# Patient Record
Sex: FEMALE | Race: WHITE | NOT HISPANIC OR LATINO | Employment: FULL TIME | URBAN - METROPOLITAN AREA
[De-identification: names, ages, dates, MRNs, and addresses within clinical notes are randomized per-mention and may not be internally consistent; named-entity substitution may affect disease eponyms.]

---
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
## 2020-06-03 ENCOUNTER — OFFICE VISIT (OUTPATIENT)
Dept: INTERNAL MEDICINE | Facility: CLINIC | Age: 20
End: 2020-06-03

## 2020-06-03 VITALS
WEIGHT: 115 LBS | SYSTOLIC BLOOD PRESSURE: 110 MMHG | HEART RATE: 78 BPM | BODY MASS INDEX: 21.16 KG/M2 | HEIGHT: 62 IN | DIASTOLIC BLOOD PRESSURE: 76 MMHG | OXYGEN SATURATION: 99 % | TEMPERATURE: 98.6 F

## 2020-06-03 DIAGNOSIS — J30.1 SEASONAL ALLERGIC RHINITIS DUE TO POLLEN: ICD-10-CM

## 2020-06-03 DIAGNOSIS — J45.20 MILD INTERMITTENT ASTHMA WITHOUT COMPLICATION: ICD-10-CM

## 2020-06-03 DIAGNOSIS — R10.13 DYSPEPSIA: Primary | ICD-10-CM

## 2020-06-03 DIAGNOSIS — F41.9 ANXIETY: ICD-10-CM

## 2020-06-03 PROCEDURE — 99202 OFFICE O/P NEW SF 15 MIN: CPT | Performed by: INTERNAL MEDICINE

## 2020-06-03 RX ORDER — DEXLANSOPRAZOLE 60 MG/1
60 CAPSULE, DELAYED RELEASE ORAL DAILY
Qty: 15 CAPSULE | Refills: 0 | COMMUNITY
Start: 2020-06-03 | End: 2020-06-22

## 2020-06-03 NOTE — PROGRESS NOTES
Patient is a 19 y.o. female who is here to establish care for anxiety and gi issues.  Chief Complaint   Patient presents with   • Anxiety   • GI Problem         HPI:    Here for evaluation of abdominal issues.  Has epigastric bloating and pain.  Has associated nausea.  Has associated right low abdominal pains.  Will be seeing GYN soon. No wt loss.  Has nausea associated.  Onset about 1 month ago.  No diarrhea or constipation.  Thinks anxiety could be playing a role.  Has food aversion bc of fear of abdominal issues.    Poor sleep, and using CBD melatonin.  Has been on prozac/buspar/welbutrin/stratera/effexor/propranolol.  Has anxiety over driving.  Was on med for GERD in the past.      History:     Patient Active Problem List   Diagnosis   • Dyspepsia   • Mild intermittent asthma without complication   • Seasonal allergic rhinitis due to pollen       Past Medical History:   Diagnosis Date   • Asthma    • Depression    • GERD (gastroesophageal reflux disease)        Past Surgical History:   Procedure Laterality Date   • APPENDECTOMY  2012       Current Outpatient Medications on File Prior to Visit   Medication Sig   • levonorgestrel (KYLEENA) 19.5 MG intrauterine device IUD 1 each by Intrauterine route 1 (One) Time.   • [DISCONTINUED] busPIRone (BUSPAR) 5 MG tablet Take 5 mg by mouth Daily.   • [DISCONTINUED] methylPREDNISolone (MEDROL, BENJA,) 4 MG tablet Take as directed on package instructions.   • [DISCONTINUED] propranolol (INDERAL) 40 MG tablet Take 10 mg by mouth As Needed.     No current facility-administered medications on file prior to visit.        Family History   Problem Relation Age of Onset   • Mental illness Mother    • Migraines Mother    • Mental illness Father    • Migraines Sister    • Breast cancer Paternal Grandmother        Social History     Socioeconomic History   • Marital status: Single     Spouse name: Not on file   • Number of children: Not on file   • Years of education: Not on file   •  "Highest education level: Not on file   Tobacco Use   • Smoking status: Never Smoker   • Smokeless tobacco: Never Used         Review of Systems   Constitutional: Negative for chills, fatigue, fever and unexpected weight change.   HENT: Negative for congestion, ear pain, hearing loss, rhinorrhea, sinus pressure, sore throat and trouble swallowing.    Eyes: Negative for discharge and itching.   Respiratory: Positive for shortness of breath (with stress). Negative for cough and chest tightness.    Cardiovascular: Negative for chest pain, palpitations and leg swelling.   Gastrointestinal: Positive for abdominal pain. Negative for blood in stool, constipation, diarrhea and vomiting.        See HPI   Endocrine: Positive for heat intolerance. Negative for polydipsia and polyuria.   Genitourinary: Negative for difficulty urinating, dysuria, enuresis, frequency, hematuria and urgency.        Hx of frequent UTI   Musculoskeletal: Negative for arthralgias, back pain, gait problem and joint swelling.   Skin: Negative for rash and wound.   Allergic/Immunologic: Positive for environmental allergies (seasonal). Negative for immunocompromised state.   Neurological: Negative for dizziness, syncope, weakness, light-headedness, numbness and headaches.   Hematological: Does not bruise/bleed easily.   Psychiatric/Behavioral: Positive for sleep disturbance. Negative for behavioral problems and dysphoric mood. The patient is nervous/anxious.        /76   Pulse 78   Temp 98.6 °F (37 °C) (Temporal)   Ht 157.5 cm (62\")   Wt 52.2 kg (115 lb)   SpO2 99%   BMI 21.03 kg/m²       Physical Exam   Constitutional: She is oriented to person, place, and time. She appears well-developed and well-nourished.   HENT:   Head: Normocephalic and atraumatic.   Right Ear: External ear normal.   Left Ear: External ear normal.   Mouth/Throat: Oropharynx is clear and moist.   Eyes: Pupils are equal, round, and reactive to light. Conjunctivae and EOM " are normal.   Neck: Normal range of motion. Neck supple.   Cardiovascular: Normal rate, regular rhythm, normal heart sounds and intact distal pulses.   Pulmonary/Chest: Effort normal and breath sounds normal.   Abdominal: Soft. Bowel sounds are normal. There is tenderness (epigastric).   Musculoskeletal: Normal range of motion.   Neurological: She is alert and oriented to person, place, and time. She has normal reflexes.   Skin: Skin is warm and dry.   Psychiatric: She has a normal mood and affect. Her behavior is normal. Judgment and thought content normal.   Vitals reviewed.      Procedure:      Discussion/Summary:    Dyspepsia/epigastric tenderness-?gastritis, labs on rtc, trial of Dexilant for 2 weeks  ILDEFONSO-f/u with   Asthma/rhinitis-stable    Current Outpatient Medications:   •  levonorgestrel (KYLEENA) 19.5 MG intrauterine device IUD, 1 each by Intrauterine route 1 (One) Time., Disp: , Rfl:   •  dexlansoprazole (Dexilant) 60 MG capsule, Take 1 capsule by mouth Daily for 30 days., Disp: 15 capsule, Rfl: 0        Cecilia was seen today for anxiety and gi problem.    Diagnoses and all orders for this visit:    Dyspepsia    Anxiety  -     Ambulatory Referral to Behavioral Health    Mild intermittent asthma without complication    Seasonal allergic rhinitis due to pollen    Other orders  -     dexlansoprazole (Dexilant) 60 MG capsule; Take 1 capsule by mouth Daily for 30 days.

## 2020-06-04 ENCOUNTER — TELEPHONE (OUTPATIENT)
Dept: INTERNAL MEDICINE | Facility: CLINIC | Age: 20
End: 2020-06-04

## 2020-06-04 DIAGNOSIS — T78.40XD ALLERGIC STATE, SUBSEQUENT ENCOUNTER: Primary | ICD-10-CM

## 2020-06-04 NOTE — TELEPHONE ENCOUNTER
Patient states that she discussed her allergies with Dr. Armenta - would like a referral to an Allergy office

## 2020-06-22 ENCOUNTER — OFFICE VISIT (OUTPATIENT)
Dept: INTERNAL MEDICINE | Facility: CLINIC | Age: 20
End: 2020-06-22

## 2020-06-22 ENCOUNTER — LAB (OUTPATIENT)
Dept: LAB | Facility: HOSPITAL | Age: 20
End: 2020-06-22

## 2020-06-22 VITALS
HEART RATE: 60 BPM | DIASTOLIC BLOOD PRESSURE: 60 MMHG | TEMPERATURE: 97.7 F | SYSTOLIC BLOOD PRESSURE: 100 MMHG | BODY MASS INDEX: 21.16 KG/M2 | WEIGHT: 115 LBS | HEIGHT: 62 IN

## 2020-06-22 DIAGNOSIS — Z13.220 SCREENING FOR LIPID DISORDERS: ICD-10-CM

## 2020-06-22 DIAGNOSIS — R10.13 DYSPEPSIA: ICD-10-CM

## 2020-06-22 DIAGNOSIS — Z11.1 SCREENING FOR TUBERCULOSIS: Primary | ICD-10-CM

## 2020-06-22 DIAGNOSIS — J30.1 SEASONAL ALLERGIC RHINITIS DUE TO POLLEN: Primary | ICD-10-CM

## 2020-06-22 DIAGNOSIS — J45.20 MILD INTERMITTENT ASTHMA WITHOUT COMPLICATION: ICD-10-CM

## 2020-06-22 LAB
DEPRECATED RDW RBC AUTO: 41.3 FL (ref 37–54)
ERYTHROCYTE [DISTWIDTH] IN BLOOD BY AUTOMATED COUNT: 12.8 % (ref 12.3–15.4)
HCT VFR BLD AUTO: 43.1 % (ref 34–46.6)
HGB BLD-MCNC: 14.9 G/DL (ref 12–15.9)
MCH RBC QN AUTO: 30.3 PG (ref 26.6–33)
MCHC RBC AUTO-ENTMCNC: 34.6 G/DL (ref 31.5–35.7)
MCV RBC AUTO: 87.6 FL (ref 79–97)
PLATELET # BLD AUTO: 229 10*3/MM3 (ref 140–450)
PMV BLD AUTO: 10.6 FL (ref 6–12)
RBC # BLD AUTO: 4.92 10*6/MM3 (ref 3.77–5.28)
WBC NRBC COR # BLD: 5.34 10*3/MM3 (ref 3.4–10.8)

## 2020-06-22 PROCEDURE — 86481 TB AG RESPONSE T-CELL SUSP: CPT | Performed by: INTERNAL MEDICINE

## 2020-06-22 PROCEDURE — 82607 VITAMIN B-12: CPT | Performed by: INTERNAL MEDICINE

## 2020-06-22 PROCEDURE — 36415 COLL VENOUS BLD VENIPUNCTURE: CPT

## 2020-06-22 PROCEDURE — 80053 COMPREHEN METABOLIC PANEL: CPT | Performed by: INTERNAL MEDICINE

## 2020-06-22 PROCEDURE — 85027 COMPLETE CBC AUTOMATED: CPT | Performed by: INTERNAL MEDICINE

## 2020-06-22 PROCEDURE — 84443 ASSAY THYROID STIM HORMONE: CPT | Performed by: INTERNAL MEDICINE

## 2020-06-22 PROCEDURE — 80061 LIPID PANEL: CPT | Performed by: INTERNAL MEDICINE

## 2020-06-22 PROCEDURE — 99213 OFFICE O/P EST LOW 20 MIN: CPT | Performed by: INTERNAL MEDICINE

## 2020-06-22 PROCEDURE — 86677 HELICOBACTER PYLORI ANTIBODY: CPT | Performed by: INTERNAL MEDICINE

## 2020-06-22 RX ORDER — OMEPRAZOLE 40 MG/1
40 CAPSULE, DELAYED RELEASE ORAL EVERY MORNING
Qty: 30 CAPSULE | Refills: 1 | OUTPATIENT
Start: 2020-06-22 | End: 2021-06-04

## 2020-06-22 NOTE — PROGRESS NOTES
Patient is a 20 y.o. female who is here for a follow up of asthma.  Chief Complaint   Patient presents with   • Asthma         HPI:    Here for mgmt of dyspepsia/gastric issues.  Was started on dexilant with some improvement.  Issues with anxiety, tried buspar in the past.  Weight is stable.  Still has not connected with  for further evaluation, after initial telehealth evaluation.      History:     Patient Active Problem List   Diagnosis   • Dyspepsia   • Mild intermittent asthma without complication   • Seasonal allergic rhinitis due to pollen   • Screening for lipid disorders       Past Medical History:   Diagnosis Date   • Asthma    • Depression    • GERD (gastroesophageal reflux disease)        Past Surgical History:   Procedure Laterality Date   • APPENDECTOMY  2012       Current Outpatient Medications on File Prior to Visit   Medication Sig   • levonorgestrel (KYLEENA) 19.5 MG intrauterine device IUD 1 each by Intrauterine route 1 (One) Time.   • [DISCONTINUED] dexlansoprazole (Dexilant) 60 MG capsule Take 1 capsule by mouth Daily for 30 days.     No current facility-administered medications on file prior to visit.        Family History   Problem Relation Age of Onset   • Mental illness Mother    • Migraines Mother    • Mental illness Father    • Migraines Sister    • Breast cancer Paternal Grandmother        Social History     Socioeconomic History   • Marital status: Single     Spouse name: Not on file   • Number of children: Not on file   • Years of education: Not on file   • Highest education level: Not on file   Tobacco Use   • Smoking status: Never Smoker   • Smokeless tobacco: Never Used         Review of Systems   Constitutional: Negative for chills, fatigue, fever and unexpected weight change.   HENT: Negative for congestion, ear pain, hearing loss, rhinorrhea, sinus pressure, sore throat and trouble swallowing.    Eyes: Negative for discharge and itching.   Respiratory: Positive for shortness of  "breath (with stress). Negative for cough and chest tightness.    Cardiovascular: Negative for chest pain, palpitations and leg swelling.   Gastrointestinal: Positive for abdominal pain (better). Negative for blood in stool, constipation, diarrhea and vomiting.        See HPI   Endocrine: Positive for heat intolerance. Negative for polydipsia and polyuria.   Genitourinary: Negative for difficulty urinating, dysuria, enuresis, frequency, hematuria and urgency.        Hx of frequent UTI   Musculoskeletal: Negative for arthralgias, back pain, gait problem and joint swelling.   Skin: Negative for rash and wound.   Allergic/Immunologic: Positive for environmental allergies (seasonal). Negative for immunocompromised state.   Neurological: Negative for dizziness, syncope, weakness, light-headedness, numbness and headaches.   Hematological: Does not bruise/bleed easily.   Psychiatric/Behavioral: Positive for sleep disturbance. Negative for behavioral problems and dysphoric mood. The patient is nervous/anxious.        /60 (BP Location: Left arm, Patient Position: Sitting)   Pulse 60   Temp 97.7 °F (36.5 °C) (Temporal)   Ht 157.5 cm (62.01\")   Wt 52.2 kg (115 lb)   BMI 21.03 kg/m²       Physical Exam   Constitutional: She is oriented to person, place, and time. She appears well-developed and well-nourished.   HENT:   Head: Normocephalic and atraumatic.   Right Ear: External ear normal.   Left Ear: External ear normal.   Mouth/Throat: Oropharynx is clear and moist.   Eyes: Pupils are equal, round, and reactive to light. Conjunctivae and EOM are normal.   Neck: Normal range of motion. Neck supple.   Cardiovascular: Normal rate, regular rhythm, normal heart sounds and intact distal pulses.   Pulmonary/Chest: Effort normal and breath sounds normal.   Abdominal: Soft. Bowel sounds are normal. There is tenderness (epigastric).   Musculoskeletal: Normal range of motion.   Neurological: She is alert and oriented to person, " place, and time. She has normal reflexes.   Skin: Skin is warm and dry.   Psychiatric: She has a normal mood and affect. Her behavior is normal. Judgment and thought content normal.   Vitals reviewed.      Procedure:      Discussion/Summary:    Dyspepsia/epigastric tenderness-50 percent improvement, change to omeprazole  ILDEFONSO-f/u with   Asthma/rhinitis-f/u with allergist    6/22 labs noted and dw patient, advised B12 500 mcg qd    Current Outpatient Medications:   •  levonorgestrel (KYLEENA) 19.5 MG intrauterine device IUD, 1 each by Intrauterine route 1 (One) Time., Disp: , Rfl:   •  omeprazole (priLOSEC) 40 MG capsule, Take 1 capsule by mouth Every Morning., Disp: 30 capsule, Rfl: 1        Cecilia was seen today for asthma.    Diagnoses and all orders for this visit:    Seasonal allergic rhinitis due to pollen    Mild intermittent asthma without complication    Dyspepsia  -     omeprazole (priLOSEC) 40 MG capsule; Take 1 capsule by mouth Every Morning.  -     CBC (No Diff)  -     Comprehensive Metabolic Panel  -     TSH  -     Vitamin B12  -     H. Pylori Antibody, IgG    Screening for lipid disorders  -     Lipid Panel

## 2020-06-23 LAB
ALBUMIN SERPL-MCNC: 4.6 G/DL (ref 3.5–5.2)
ALBUMIN/GLOB SERPL: 2.2 G/DL
ALP SERPL-CCNC: 66 U/L (ref 39–117)
ALT SERPL W P-5'-P-CCNC: 13 U/L (ref 1–33)
ANION GAP SERPL CALCULATED.3IONS-SCNC: 8.8 MMOL/L (ref 5–15)
AST SERPL-CCNC: 18 U/L (ref 1–32)
BILIRUB SERPL-MCNC: 0.5 MG/DL (ref 0.2–1.2)
BUN BLD-MCNC: 13 MG/DL (ref 6–20)
BUN/CREAT SERPL: 18.3 (ref 7–25)
CALCIUM SPEC-SCNC: 9.3 MG/DL (ref 8.6–10.5)
CHLORIDE SERPL-SCNC: 104 MMOL/L (ref 98–107)
CHOLEST SERPL-MCNC: 144 MG/DL (ref 0–200)
CO2 SERPL-SCNC: 25.2 MMOL/L (ref 22–29)
CREAT BLD-MCNC: 0.71 MG/DL (ref 0.57–1)
GFR SERPL CREATININE-BSD FRML MDRD: 105 ML/MIN/1.73
GLOBULIN UR ELPH-MCNC: 2.1 GM/DL
GLUCOSE BLD-MCNC: 84 MG/DL (ref 65–99)
H PYLORI IGG SER IA-ACNC: NEGATIVE
HDLC SERPL-MCNC: 64 MG/DL (ref 40–60)
LDLC SERPL CALC-MCNC: 72 MG/DL (ref 0–100)
LDLC/HDLC SERPL: 1.12 {RATIO}
POTASSIUM BLD-SCNC: 4.2 MMOL/L (ref 3.5–5.2)
PROT SERPL-MCNC: 6.7 G/DL (ref 6–8.5)
SODIUM BLD-SCNC: 138 MMOL/L (ref 136–145)
TRIGL SERPL-MCNC: 42 MG/DL (ref 0–150)
TSH SERPL DL<=0.05 MIU/L-ACNC: 2.37 UIU/ML (ref 0.27–4.2)
VIT B12 BLD-MCNC: 225 PG/ML (ref 211–946)
VLDLC SERPL-MCNC: 8.4 MG/DL (ref 5–40)

## 2020-06-24 LAB — TSPOT INTERPRETATION: NORMAL

## 2020-06-29 DIAGNOSIS — Z11.1 SCREENING-PULMONARY TB: Primary | ICD-10-CM

## 2020-07-01 ENCOUNTER — LAB (OUTPATIENT)
Dept: LAB | Facility: HOSPITAL | Age: 20
End: 2020-07-01

## 2020-07-01 DIAGNOSIS — Z11.1 SCREENING-PULMONARY TB: ICD-10-CM

## 2020-07-01 PROCEDURE — 86481 TB AG RESPONSE T-CELL SUSP: CPT | Performed by: INTERNAL MEDICINE

## 2020-07-04 LAB — TSPOT INTERPRETATION: NORMAL

## 2020-09-30 ENCOUNTER — TELEPHONE (OUTPATIENT)
Dept: INTERNAL MEDICINE | Facility: CLINIC | Age: 20
End: 2020-09-30

## 2020-09-30 RX ORDER — FLUCONAZOLE 150 MG/1
150 TABLET ORAL ONCE
Qty: 1 TABLET | Refills: 0 | Status: SHIPPED | OUTPATIENT
Start: 2020-09-30 | End: 2020-09-30

## 2020-09-30 NOTE — TELEPHONE ENCOUNTER
Caller: Cecilia Granger    Relationship: Self    Best call back number: [phone]    What medication are you requesting: Diflucan     What are your current symptoms: Yeast infection     How long have you been experiencing symptoms: 3 days     Have you had these symptoms before:    [x] Yes  [] No    Have you been treated for these symptoms before:   [x] Yes  [] No    If a prescription is needed, what is your preferred pharmacy and phone number:  [facility] [address] [facility] - [phone] [facility] [phone]      Additional notes: Patient states she has been prescribed this medication before for a yeast infection but her gynecologist office is closed and the patient was directed to call her primary care provider.

## 2020-10-29 RX ORDER — ALBUTEROL SULFATE 90 UG/1
2 AEROSOL, METERED RESPIRATORY (INHALATION) EVERY 6 HOURS PRN
Qty: 18 G | Refills: 2 | Status: SHIPPED | OUTPATIENT
Start: 2020-10-29 | End: 2021-03-23 | Stop reason: SDUPTHER

## 2020-11-23 PROCEDURE — U0004 COV-19 TEST NON-CDC HGH THRU: HCPCS | Performed by: PERSONAL EMERGENCY RESPONSE ATTENDANT

## 2020-11-23 PROCEDURE — 87086 URINE CULTURE/COLONY COUNT: CPT | Performed by: PERSONAL EMERGENCY RESPONSE ATTENDANT

## 2020-11-23 PROCEDURE — 87186 SC STD MICRODIL/AGAR DIL: CPT | Performed by: PERSONAL EMERGENCY RESPONSE ATTENDANT

## 2020-11-23 PROCEDURE — 87077 CULTURE AEROBIC IDENTIFY: CPT | Performed by: PERSONAL EMERGENCY RESPONSE ATTENDANT

## 2020-11-25 ENCOUNTER — TELEPHONE (OUTPATIENT)
Dept: URGENT CARE | Facility: CLINIC | Age: 20
End: 2020-11-25

## 2020-11-25 DIAGNOSIS — N39.0 URINARY TRACT INFECTION WITHOUT HEMATURIA, SITE UNSPECIFIED: Primary | ICD-10-CM

## 2020-11-25 RX ORDER — NITROFURANTOIN 25; 75 MG/1; MG/1
100 CAPSULE ORAL EVERY 12 HOURS SCHEDULED
Qty: 14 CAPSULE | Refills: 0 | Status: SHIPPED | OUTPATIENT
Start: 2020-11-25 | End: 2020-12-02

## 2020-11-27 PROCEDURE — U0004 COV-19 TEST NON-CDC HGH THRU: HCPCS | Performed by: PERSONAL EMERGENCY RESPONSE ATTENDANT

## 2020-12-22 NOTE — TELEPHONE ENCOUNTER
Caller: Cecilia Granger    Relationship: Self    Best call back number: [phone]     Medication needed: TRETINOIN GEL FOR FACE    When do you need the refill by: ASAP    What details did the patient provide when requesting the medication: PATIENT STATES THAT SHE WOULD LIKE TO POSSIBLE GET THIS MEDICATION.    Does the patient have less than a 3 day supply:  [] Yes  [] No    What is the patient's preferred pharmacy: [facility] [place] [address] [facility] [address] - [phone]  - [phone] FX

## 2020-12-23 RX ORDER — TRETINOIN 0.05 G/100G
GEL TOPICAL
Qty: 45 G | Refills: 5 | Status: SHIPPED | OUTPATIENT
Start: 2020-12-23 | End: 2021-03-01 | Stop reason: SDUPTHER

## 2021-03-01 RX ORDER — TRETINOIN 0.05 G/100G
GEL TOPICAL
Qty: 45 G | Refills: 5 | Status: SHIPPED | OUTPATIENT
Start: 2021-03-01 | End: 2021-07-26

## 2021-03-01 NOTE — TELEPHONE ENCOUNTER
Caller: Cecilia Granger    Relationship: Self    Best call back number:[phone]    What medication are you requesting: HYDROXYZINE  What are your current symptoms: ALLERGY/ANXIETY    How long have you been experiencing symptoms: 1 MONTH    Have you had these symptoms before:    [x] Yes  [] No    Have you been treated for these symptoms before:   [x] Yes  [] No    If a prescription is needed, what is your preferred pharmacy and phone number:      [facility] [address] [place], [place] - [address] - [phone]  - [phone] FX    PLEASE SEND TO MY CHART IF THIS CAN BE CALLED IN      Additional notes:  PRESCRIBED BY  PHYSICIAN DR DEJESUS

## 2021-03-23 RX ORDER — ALBUTEROL SULFATE 90 UG/1
2 AEROSOL, METERED RESPIRATORY (INHALATION) EVERY 6 HOURS PRN
Qty: 18 G | Refills: 5 | Status: SHIPPED | OUTPATIENT
Start: 2021-03-23 | End: 2021-07-26 | Stop reason: SDUPTHER

## 2021-03-23 RX ORDER — ALBUTEROL SULFATE 90 UG/1
2 AEROSOL, METERED RESPIRATORY (INHALATION) EVERY 6 HOURS PRN
Qty: 18 G | Refills: 5 | Status: SHIPPED | OUTPATIENT
Start: 2021-03-23 | End: 2021-03-23 | Stop reason: SDUPTHER

## 2021-03-23 NOTE — TELEPHONE ENCOUNTER
Caller: Cecilia Granger    Relationship: Self    Best call back number: [phone]    Medication needed:   INHALER CHAMBER    When do you need the refill by:ASAP    What additional details did the patient provide when requesting the medication:     Does the patient have less than a 3 day supply:  [] Yes  [] No    What is the patient's preferred pharmacy:    [facility] [address] [facility] [phone] [facility] [phone] FX

## 2021-03-23 NOTE — TELEPHONE ENCOUNTER
Caller: Cecilia Granger    Relationship: Self    Best call back number: [phone]    Medication needed:   Requested Prescriptions     Pending Prescriptions Disp Refills   • albuterol sulfate  (90 Base) MCG/ACT inhaler 18 g 2     Sig: Inhale 2 puffs Every 6 (Six) Hours As Needed for Wheezing or Shortness of Air.       When do you need the refill by: ASAP    What additional details did the patient provide when requesting the medication: USED FREQUENTLY DUE TO COVID  Does the patient have less than a 3 day supply:  [x] Yes  [] No    What is the patient's preferred pharmacy:      [facility] [address] [phone] [facility] [phone] FX

## 2021-06-03 ENCOUNTER — TELEPHONE (OUTPATIENT)
Dept: INTERNAL MEDICINE | Facility: CLINIC | Age: 21
End: 2021-06-03

## 2021-06-03 PROCEDURE — 87109 MYCOPLASMA: CPT | Performed by: PHYSICIAN ASSISTANT

## 2021-06-03 PROCEDURE — 87661 TRICHOMONAS VAGINALIS AMPLIF: CPT | Performed by: PHYSICIAN ASSISTANT

## 2021-06-03 PROCEDURE — 87798 DETECT AGENT NOS DNA AMP: CPT | Performed by: PHYSICIAN ASSISTANT

## 2021-06-03 PROCEDURE — 87801 DETECT AGNT MULT DNA AMPLI: CPT | Performed by: PHYSICIAN ASSISTANT

## 2021-06-03 PROCEDURE — 87088 URINE BACTERIA CULTURE: CPT | Performed by: PHYSICIAN ASSISTANT

## 2021-06-03 PROCEDURE — 87529 HSV DNA AMP PROBE: CPT | Performed by: PHYSICIAN ASSISTANT

## 2021-06-03 PROCEDURE — 87591 N.GONORRHOEAE DNA AMP PROB: CPT | Performed by: PHYSICIAN ASSISTANT

## 2021-06-03 PROCEDURE — 87086 URINE CULTURE/COLONY COUNT: CPT | Performed by: PHYSICIAN ASSISTANT

## 2021-06-03 PROCEDURE — 87186 SC STD MICRODIL/AGAR DIL: CPT | Performed by: PHYSICIAN ASSISTANT

## 2021-06-03 PROCEDURE — 87491 CHLMYD TRACH DNA AMP PROBE: CPT | Performed by: PHYSICIAN ASSISTANT

## 2021-06-03 NOTE — TELEPHONE ENCOUNTER
PT CALLED STATED THAT SHE WAS SEEN T URGENT CARE TODAY AND WAS TOLD TO CONTACT DR TO REQUEST A CT SCAN AND GI DR, THEY BELIEVE THE PT HAS AN ULCER.    PLEASE ADVISE.  CALL BACK:[phone]

## 2021-06-04 ENCOUNTER — APPOINTMENT (OUTPATIENT)
Dept: CT IMAGING | Facility: HOSPITAL | Age: 21
End: 2021-06-04

## 2021-06-04 ENCOUNTER — HOSPITAL ENCOUNTER (EMERGENCY)
Facility: HOSPITAL | Age: 21
Discharge: HOME | End: 2021-06-04
Attending: EMERGENCY MEDICINE | Admitting: EMERGENCY MEDICINE

## 2021-06-04 VITALS
TEMPERATURE: 98.6 F | OXYGEN SATURATION: 98 % | DIASTOLIC BLOOD PRESSURE: 78 MMHG | RESPIRATION RATE: 18 BRPM | WEIGHT: 112 LBS | HEART RATE: 97 BPM | HEIGHT: 62 IN | BODY MASS INDEX: 20.61 KG/M2 | SYSTOLIC BLOOD PRESSURE: 117 MMHG

## 2021-06-04 DIAGNOSIS — K92.1 BLACK TARRY STOOLS: ICD-10-CM

## 2021-06-04 DIAGNOSIS — R10.13 EPIGASTRIC ABDOMINAL PAIN: Primary | ICD-10-CM

## 2021-06-04 LAB
ALBUMIN SERPL-MCNC: 4.6 G/DL (ref 3.5–5.2)
ALBUMIN/GLOB SERPL: 1.8 G/DL
ALP SERPL-CCNC: 85 U/L (ref 39–117)
ALT SERPL W P-5'-P-CCNC: 10 U/L (ref 1–33)
ANION GAP SERPL CALCULATED.3IONS-SCNC: 9 MMOL/L (ref 5–15)
AST SERPL-CCNC: 15 U/L (ref 1–32)
B-HCG UR QL: NEGATIVE
BACTERIA UR QL AUTO: ABNORMAL /HPF
BASOPHILS # BLD AUTO: 0.03 10*3/MM3 (ref 0–0.2)
BASOPHILS NFR BLD AUTO: 0.4 % (ref 0–1.5)
BILIRUB SERPL-MCNC: 0.3 MG/DL (ref 0–1.2)
BILIRUB UR QL STRIP: NEGATIVE
BUN SERPL-MCNC: 9 MG/DL (ref 6–20)
BUN/CREAT SERPL: 10.7 (ref 7–25)
CALCIUM SPEC-SCNC: 9.9 MG/DL (ref 8.6–10.5)
CHLORIDE SERPL-SCNC: 106 MMOL/L (ref 98–107)
CLARITY UR: ABNORMAL
CO2 SERPL-SCNC: 29 MMOL/L (ref 22–29)
COLOR UR: YELLOW
CREAT SERPL-MCNC: 0.84 MG/DL (ref 0.57–1)
DEPRECATED RDW RBC AUTO: 41.8 FL (ref 37–54)
EOSINOPHIL # BLD AUTO: 0.32 10*3/MM3 (ref 0–0.4)
EOSINOPHIL NFR BLD AUTO: 4.3 % (ref 0.3–6.2)
ERYTHROCYTE [DISTWIDTH] IN BLOOD BY AUTOMATED COUNT: 12.7 % (ref 12.3–15.4)
GFR SERPL CREATININE-BSD FRML MDRD: 86 ML/MIN/1.73
GLOBULIN UR ELPH-MCNC: 2.5 GM/DL
GLUCOSE SERPL-MCNC: 88 MG/DL (ref 65–99)
GLUCOSE UR STRIP-MCNC: NEGATIVE MG/DL
HCT VFR BLD AUTO: 42.8 % (ref 34–46.6)
HGB BLD-MCNC: 14.2 G/DL (ref 12–15.9)
HGB UR QL STRIP.AUTO: ABNORMAL
HOLD SPECIMEN: NORMAL
HYALINE CASTS UR QL AUTO: ABNORMAL /LPF
IMM GRANULOCYTES # BLD AUTO: 0.03 10*3/MM3 (ref 0–0.05)
IMM GRANULOCYTES NFR BLD AUTO: 0.4 % (ref 0–0.5)
INTERNAL NEGATIVE CONTROL: NORMAL
INTERNAL POSITIVE CONTROL: NORMAL
KETONES UR QL STRIP: NEGATIVE
LEUKOCYTE ESTERASE UR QL STRIP.AUTO: ABNORMAL
LIPASE SERPL-CCNC: 13 U/L (ref 13–60)
LYMPHOCYTES # BLD AUTO: 1.49 10*3/MM3 (ref 0.7–3.1)
LYMPHOCYTES NFR BLD AUTO: 20.1 % (ref 19.6–45.3)
Lab: NORMAL
MCH RBC QN AUTO: 29.6 PG (ref 26.6–33)
MCHC RBC AUTO-ENTMCNC: 33.2 G/DL (ref 31.5–35.7)
MCV RBC AUTO: 89.4 FL (ref 79–97)
MONOCYTES # BLD AUTO: 0.49 10*3/MM3 (ref 0.1–0.9)
MONOCYTES NFR BLD AUTO: 6.6 % (ref 5–12)
NEUTROPHILS NFR BLD AUTO: 5.05 10*3/MM3 (ref 1.7–7)
NEUTROPHILS NFR BLD AUTO: 68.2 % (ref 42.7–76)
NITRITE UR QL STRIP: POSITIVE
NRBC BLD AUTO-RTO: 0 /100 WBC (ref 0–0.2)
PH UR STRIP.AUTO: 8 [PH] (ref 5–8)
PLATELET # BLD AUTO: 228 10*3/MM3 (ref 140–450)
PMV BLD AUTO: 9.5 FL (ref 6–12)
POTASSIUM SERPL-SCNC: 4.4 MMOL/L (ref 3.5–5.2)
PROT SERPL-MCNC: 7.1 G/DL (ref 6–8.5)
PROT UR QL STRIP: NEGATIVE
RBC # BLD AUTO: 4.79 10*6/MM3 (ref 3.77–5.28)
RBC # UR: ABNORMAL /HPF
REF LAB TEST METHOD: ABNORMAL
SODIUM SERPL-SCNC: 144 MMOL/L (ref 136–145)
SP GR UR STRIP: 1.02 (ref 1–1.03)
SQUAMOUS #/AREA URNS HPF: ABNORMAL /HPF
UROBILINOGEN UR QL STRIP: ABNORMAL
WBC # BLD AUTO: 7.41 10*3/MM3 (ref 3.4–10.8)
WBC UR QL AUTO: ABNORMAL /HPF
WHOLE BLOOD HOLD SPECIMEN: NORMAL

## 2021-06-04 PROCEDURE — 85025 COMPLETE CBC W/AUTO DIFF WBC: CPT

## 2021-06-04 PROCEDURE — 96375 TX/PRO/DX INJ NEW DRUG ADDON: CPT

## 2021-06-04 PROCEDURE — 99283 EMERGENCY DEPT VISIT LOW MDM: CPT

## 2021-06-04 PROCEDURE — 80053 COMPREHEN METABOLIC PANEL: CPT

## 2021-06-04 PROCEDURE — 87186 SC STD MICRODIL/AGAR DIL: CPT | Performed by: PHYSICIAN ASSISTANT

## 2021-06-04 PROCEDURE — 87086 URINE CULTURE/COLONY COUNT: CPT | Performed by: PHYSICIAN ASSISTANT

## 2021-06-04 PROCEDURE — 81025 URINE PREGNANCY TEST: CPT | Performed by: EMERGENCY MEDICINE

## 2021-06-04 PROCEDURE — 83690 ASSAY OF LIPASE: CPT

## 2021-06-04 PROCEDURE — 74177 CT ABD & PELVIS W/CONTRAST: CPT

## 2021-06-04 PROCEDURE — 87088 URINE BACTERIA CULTURE: CPT | Performed by: PHYSICIAN ASSISTANT

## 2021-06-04 PROCEDURE — 81001 URINALYSIS AUTO W/SCOPE: CPT | Performed by: EMERGENCY MEDICINE

## 2021-06-04 PROCEDURE — 25010000002 IOPAMIDOL 61 % SOLUTION: Performed by: EMERGENCY MEDICINE

## 2021-06-04 PROCEDURE — 96374 THER/PROPH/DIAG INJ IV PUSH: CPT

## 2021-06-04 PROCEDURE — 25010000002 ONDANSETRON PER 1 MG: Performed by: PHYSICIAN ASSISTANT

## 2021-06-04 RX ORDER — PANTOPRAZOLE SODIUM 40 MG/1
40 TABLET, DELAYED RELEASE ORAL
Qty: 30 TABLET | Refills: 0 | Status: SHIPPED | OUTPATIENT
Start: 2021-06-04 | End: 2021-07-04

## 2021-06-04 RX ORDER — SODIUM CHLORIDE 0.9 % (FLUSH) 0.9 %
10 SYRINGE (ML) INJECTION AS NEEDED
Status: DISCONTINUED | OUTPATIENT
Start: 2021-06-04 | End: 2021-06-04 | Stop reason: HOSPADM

## 2021-06-04 RX ORDER — ONDANSETRON 2 MG/ML
4 INJECTION INTRAMUSCULAR; INTRAVENOUS ONCE
Status: COMPLETED | OUTPATIENT
Start: 2021-06-04 | End: 2021-06-04

## 2021-06-04 RX ORDER — DICYCLOMINE HCL 20 MG
20 TABLET ORAL EVERY 8 HOURS PRN
Qty: 30 TABLET | Refills: 0 | Status: SHIPPED | OUTPATIENT
Start: 2021-06-04 | End: 2021-12-02

## 2021-06-04 RX ORDER — SODIUM CHLORIDE 9 MG/ML
10 INJECTION INTRAVENOUS AS NEEDED
Status: DISCONTINUED | OUTPATIENT
Start: 2021-06-04 | End: 2021-06-04 | Stop reason: HOSPADM

## 2021-06-04 RX ORDER — PANTOPRAZOLE SODIUM 40 MG/10ML
80 INJECTION, POWDER, LYOPHILIZED, FOR SOLUTION INTRAVENOUS ONCE
Status: COMPLETED | OUTPATIENT
Start: 2021-06-04 | End: 2021-06-04

## 2021-06-04 RX ADMIN — IOPAMIDOL 100 ML: 612 INJECTION, SOLUTION INTRAVENOUS at 20:30

## 2021-06-04 RX ADMIN — ONDANSETRON 4 MG: 2 INJECTION INTRAMUSCULAR; INTRAVENOUS at 19:19

## 2021-06-04 RX ADMIN — PANTOPRAZOLE SODIUM 80 MG: 40 INJECTION, POWDER, FOR SOLUTION INTRAVENOUS at 19:18

## 2021-06-04 NOTE — ED PROVIDER NOTES
EMERGENCY DEPARTMENT ENCOUNTER    Pt Name: Cecilia Granger  MRN: [id number]  Pt :   2000  Room Number:  RW2/R2  Date of encounter:  2021  PCP: Chapincito Armenta MD  ED Provider: Hemanth Khanna PA-C    Historian: patient      HPI:  Chief Complaint: abdominal pain, black/ tarry stool        Context: Cecilia Granger is a 20 y.o. female who presents to the ED with abdominal pain, bloating and black/ tarry stools. Patient states she has had abdominal pain for 2-3 months that she describes as dull, sharp and stabbing. Also complains of nausea, heartburn and regurgitation postprandially, denies vomiting. Complains of lightheadedness periodically. Patient states she has had black/ tarry stools over the past several days and admits to taking a large amount of Pepto-bismol for her abdominal pain. Last BM was this morning. Patient is currently on her period. States her periods are not regular as she has had an IUD for 3 years. Denies fever, chills.       PAST MEDICAL HISTORY  Past Medical History:   Diagnosis Date   • Asthma    • Depression    • GERD (gastroesophageal reflux disease)          PAST SURGICAL HISTORY  Past Surgical History:   Procedure Laterality Date   • APPENDECTOMY           FAMILY HISTORY  Family History   Problem Relation Age of Onset   • Mental illness Mother    • Migraines Mother    • Mental illness Father    • Migraines Sister    • Breast cancer Paternal Grandmother          SOCIAL HISTORY  Social History     Socioeconomic History   • Marital status: Single     Spouse name: Not on file   • Number of children: Not on file   • Years of education: Not on file   • Highest education level: Not on file   Tobacco Use   • Smoking status: Never Smoker   • Smokeless tobacco: Never Used   Vaping Use   • Vaping Use: Never used   Substance and Sexual Activity   • Alcohol use: Yes   • Drug use: Yes     Types: Marijuana   • Sexual activity: Yes     Partners: Male     Birth control/protection:  I.U.D.         ALLERGIES  Azithromycin, Cephalosporins, Clindamycin/lincomycin, Flagyl [metronidazole], Macrobid [nitrofurantoin], Penicillins, and Sulfa antibiotics        REVIEW OF SYSTEMS  Review of Systems   Constitutional: Positive for fatigue. Negative for activity change, appetite change, chills and fever.   HENT: Negative for congestion, rhinorrhea and sore throat.    Respiratory: Negative for cough, shortness of breath and wheezing.    Cardiovascular: Negative for chest pain and palpitations.   Gastrointestinal: Positive for abdominal distention, abdominal pain, diarrhea and nausea. Negative for vomiting.   Genitourinary: Negative for dyspareunia, dysuria, flank pain, hematuria, menstrual problem, pelvic pain, urgency, vaginal bleeding, vaginal discharge and vaginal pain.   Musculoskeletal: Negative for back pain, neck pain and neck stiffness.   Neurological: Positive for light-headedness. Negative for syncope and weakness.   All other systems reviewed and are negative.         All systems reviewed and negative except for those discussed in HPI.       PHYSICAL EXAM    I have reviewed the triage vital signs and nursing notes.    ED Triage Vitals [06/04/21 1736]   Temp Heart Rate Resp BP SpO2   98.6 °F (37 °C) 97 18 117/78 98 %      Temp src Heart Rate Source Patient Position BP Location FiO2 (%)   Temporal Monitor Sitting Left arm --       Physical Exam  GENERAL:  Patient is awake, alert and oriented. Hemodynamically stable, afebrile, not toxic appearing and in no acute distress.   HENT: Nares patent.  EYES: No scleral icterus.  CV: Regular rhythm, regular rate. No murmurs, rubs or gallops  RESPIRATORY: Normal effort.  No audible wheezes, rales or rhonchi.  ABDOMEN: Soft, not distended, normal bowel sounds. Tender to palpation in the epigastric area. No rebound tenderness. No guarding or rigidity.   MUSCULOSKELETAL: No deformities.   NEURO: Alert, moves all extremities, follows commands.  SKIN: Warm, dry, no  rash visualized.        LAB RESULTS  Recent Results (from the past 24 hour(s))   Comprehensive Metabolic Panel    Collection Time: 06/04/21  5:54 PM    Specimen: Blood   Result Value Ref Range    Glucose 88 65 - 99 mg/dL    BUN 9 6 - 20 mg/dL    Creatinine 0.84 0.57 - 1.00 mg/dL    Sodium 144 136 - 145 mmol/L    Potassium 4.4 3.5 - 5.2 mmol/L    Chloride 106 98 - 107 mmol/L    CO2 29.0 22.0 - 29.0 mmol/L    Calcium 9.9 8.6 - 10.5 mg/dL    Total Protein 7.1 6.0 - 8.5 g/dL    Albumin 4.60 3.50 - 5.20 g/dL    ALT (SGPT) 10 1 - 33 U/L    AST (SGOT) 15 1 - 32 U/L    Alkaline Phosphatase 85 39 - 117 U/L    Total Bilirubin 0.3 0.0 - 1.2 mg/dL    eGFR Non African Amer 86 >60 mL/min/1.73    Globulin 2.5 gm/dL    A/G Ratio 1.8 g/dL    BUN/Creatinine Ratio 10.7 7.0 - 25.0    Anion Gap 9.0 5.0 - 15.0 mmol/L   Lipase    Collection Time: 06/04/21  5:54 PM    Specimen: Blood   Result Value Ref Range    Lipase 13 13 - 60 U/L   Green Top (Gel)    Collection Time: 06/04/21  5:54 PM   Result Value Ref Range    Extra Tube Hold for add-ons.    Lavender Top    Collection Time: 06/04/21  5:54 PM   Result Value Ref Range    Extra Tube hold for add-on    Gold Top - SST    Collection Time: 06/04/21  5:54 PM   Result Value Ref Range    Extra Tube Hold for add-ons.    CBC Auto Differential    Collection Time: 06/04/21  5:54 PM    Specimen: Blood   Result Value Ref Range    WBC 7.41 3.40 - 10.80 10*3/mm3    RBC 4.79 3.77 - 5.28 10*6/mm3    Hemoglobin 14.2 12.0 - 15.9 g/dL    Hematocrit 42.8 34.0 - 46.6 %    MCV 89.4 79.0 - 97.0 fL    MCH 29.6 26.6 - 33.0 pg    MCHC 33.2 31.5 - 35.7 g/dL    RDW 12.7 12.3 - 15.4 %    RDW-SD 41.8 37.0 - 54.0 fl    MPV 9.5 6.0 - 12.0 fL    Platelets 228 140 - 450 10*3/mm3    Neutrophil % 68.2 42.7 - 76.0 %    Lymphocyte % 20.1 19.6 - 45.3 %    Monocyte % 6.6 5.0 - 12.0 %    Eosinophil % 4.3 0.3 - 6.2 %    Basophil % 0.4 0.0 - 1.5 %    Immature Grans % 0.4 0.0 - 0.5 %    Neutrophils, Absolute 5.05 1.70 - 7.00  10*3/mm3    Lymphocytes, Absolute 1.49 0.70 - 3.10 10*3/mm3    Monocytes, Absolute 0.49 0.10 - 0.90 10*3/mm3    Eosinophils, Absolute 0.32 0.00 - 0.40 10*3/mm3    Basophils, Absolute 0.03 0.00 - 0.20 10*3/mm3    Immature Grans, Absolute 0.03 0.00 - 0.05 10*3/mm3    nRBC 0.0 0.0 - 0.2 /100 WBC   Urinalysis With Microscopic If Indicated (No Culture) - Urine, Clean Catch    Collection Time: 06/04/21  7:06 PM    Specimen: Urine, Clean Catch   Result Value Ref Range    Color, UA Yellow Yellow, Straw    Appearance, UA Turbid (A) Clear    pH, UA 8.0 5.0 - 8.0    Specific Gravity, UA 1.016 1.001 - 1.030    Glucose, UA Negative Negative    Ketones, UA Negative Negative    Bilirubin, UA Negative Negative    Blood, UA Moderate (2+) (A) Negative    Protein, UA Negative Negative    Leuk Esterase, UA Small (1+) (A) Negative    Nitrite, UA Positive (A) Negative    Urobilinogen, UA 0.2 E.U./dL 0.2 - 1.0 E.U./dL   Urinalysis, Microscopic Only - Urine, Clean Catch    Collection Time: 06/04/21  7:06 PM    Specimen: Urine, Clean Catch   Result Value Ref Range    RBC, UA 7-12 (A) None Seen, 0-2 /HPF    WBC, UA 6-12 (A) None Seen, 0-2 /HPF    Bacteria, UA 4+ (A) None Seen, Trace /HPF    Squamous Epithelial Cells, UA 0-2 None Seen, 0-2 /HPF    Hyaline Casts, UA 0-6 0 - 6 /LPF    Methodology Automated Microscopy    POC Pregnancy, Urine    Collection Time: 06/04/21  7:13 PM    Specimen: Urine   Result Value Ref Range    HCG, Urine, QL Negative Negative    Lot Number [id number]     Internal Positive Control Passed Passed    Internal Negative Control Passed Passed       If labs were ordered, I independently reviewed the results.        RADIOLOGY  CT Abdomen Pelvis With Contrast    Result Date: 6/4/2021  CT Abdomen Pelvis W INDICATION: Black tarry stool, constipation TECHNIQUE: CT of the abdomen and pelvis with IV contrast. Coronal and sagittal reconstructions were obtained.  Radiation dose reduction techniques included automated exposure control  or exposure modulation based on body size. Count of known CT and cardiac nuc med studies performed in previous 12 months: 0. COMPARISON: None available. FINDINGS: Abdomen: Lung bases are clear. Liver, spleen, kidneys and pancreas are unremarkable. There is a nonobstructing left renal stone. Pelvis: There has been prior appendectomy. There is an IUD and fluid in the pelvis. No definite bowel abnormality is appreciated. There are bilateral ovarian follicular cysts. cNo acute osseous abnormality.     No acute intra-abdominal abnormality. Signer Name: Savanah Chow MD  Signed: 6/4/2021 8:44 PM  Workstation Name: [id number]  [facility]        PROCEDURES    Procedures    No orders to display       MEDICATIONS GIVEN IN ER    Medications   Sodium Chloride (PF) 0.9 % 10 mL (has no administration in time range)   sodium chloride 0.9 % flush 10 mL (has no administration in time range)   pantoprazole (PROTONIX) injection 80 mg (80 mg Intravenous Given 6/4/21 1918)   ondansetron (ZOFRAN) injection 4 mg (4 mg Intravenous Given 6/4/21 1919)   iopamidol (ISOVUE-300) 61 % injection 100 mL (100 mL Intravenous Given 6/4/21 2030)         PROGRESS, DATA ANALYSIS, CONSULTS, AND MEDICAL DECISION MAKING    All labs have been independently reviewed by me.  All radiology studies have been reviewed by me and the radiologist dictating the report.   EKG's have been independently viewed and interpreted by me.            ED Course as of Jun 04 2116 Fri Jun 04, 2021 1938 Nitrite, UA(!): Positive [TG]   1939 Bacteria, UA(!): 4+ [TG]   1939 WBC, UA(!): 6-12 [TG]      ED Course User Index  [TG] Hemanth Khanna PA-C     Fecal occult blood negative by me.  She does admit to drinking large amounts of Pepto-Bismol which is most likely the cause of her black stool.  Labs and CT encouraging.  We will discharge to home with Protonix 40 mg every morning before breakfast, bland diet, Bentyl as needed for stomach pain, and refer  to gastroenterology for further evaluation.  Signs symptoms worsening were reviewed and she was encouraged return immediately if any change or worsening of symptoms.  She verbalizes understanding and is agreeable to plan.      AS OF 21:16 EDT VITALS:    BP - 117/78  HR - 97  TEMP - 98.6 °F (37 °C) (Temporal)  O2 SATS - 98%        DIAGNOSIS  Final diagnoses:   Epigastric abdominal pain   Black tarry stools         DISPOSITION  DISCHARGE    Patient discharged in stable condition.    Reviewed implications of results, diagnosis, meds, responsibility to follow up, warning signs and symptoms of possible worsening, potential complications and reasons to return to ER.    Patient/Family voiced understanding of above instructions.    Discussed plan for discharge, as there is no emergent indication for admission.  Pt/family is agreeable and understands need for follow up and possible repeat testing.  Pt/family is aware that discharge does not mean that nothing is wrong but that it indicates no emergency is currently present that requires admission and they must continue care with follow-up as given below or with a physician of their choice.     FOLLOW-UP  Chapincito Armenta MD  [address]  [address]  [facility] [id number] [phone]    Schedule an appointment as soon as possible for a visit in 3 days  For recheck    Faisal Keita MD  [address]  [address]  [facility] [id number] [phone]    Schedule an appointment as soon as possible for a visit   For gastroenterology evaluation         Medication List      New Prescriptions    dicyclomine 20 MG tablet  Commonly known as: BENTYL  Take 1 tablet by mouth Every 8 (Eight) Hours As Needed (Stomach cramps).     pantoprazole 40 MG EC tablet  Commonly known as: PROTONIX  Take 1 tablet by mouth Every Morning Before Breakfast for 30 days.        Stop    omeprazole 40 MG capsule  Commonly known as: priLOSEC           Where to Get Your Medications      [facility] [place] [place] - [address] - [phone]  - [phone]   [address] [facility] [address]    Phone: [phone]   · dicyclomine 20 MG tablet  · pantoprazole 40 MG EC tablet                  Hemanth Khanna PA-C  06/04/21 4726

## 2021-06-05 ENCOUNTER — TELEPHONE (OUTPATIENT)
Dept: URGENT CARE | Facility: CLINIC | Age: 21
End: 2021-06-05

## 2021-06-05 NOTE — DISCHARGE INSTRUCTIONS
Discontinue / decrease your Pepto-Bismol intake.  Discontinue Prilosec, and begin taking Protonix daily before breakfast.  Follow-up with Dr. Armenta on Monday for recheck.  Follow-up with Dr. Keita for gastroenterology evaluation.  Return to the emergency department immediately if any change or worsening of symptoms.

## 2021-06-06 LAB — BACTERIA SPEC AEROBE CULT: ABNORMAL

## 2021-06-07 ENCOUNTER — TELEPHONE (OUTPATIENT)
Dept: GASTROENTEROLOGY | Facility: CLINIC | Age: 21
End: 2021-06-07

## 2021-06-07 NOTE — TELEPHONE ENCOUNTER
Patient called to schedule ER f/u states it is urgent and needs to be seen sooner than July, please advise  Thank you

## 2021-06-10 ENCOUNTER — OFFICE VISIT (OUTPATIENT)
Dept: INTERNAL MEDICINE | Facility: CLINIC | Age: 21
End: 2021-06-10

## 2021-06-10 VITALS
DIASTOLIC BLOOD PRESSURE: 64 MMHG | HEART RATE: 58 BPM | SYSTOLIC BLOOD PRESSURE: 110 MMHG | BODY MASS INDEX: 20.43 KG/M2 | TEMPERATURE: 97.1 F | OXYGEN SATURATION: 97 % | HEIGHT: 62 IN | WEIGHT: 111 LBS

## 2021-06-10 DIAGNOSIS — R10.13 DYSPEPSIA: Primary | ICD-10-CM

## 2021-06-10 PROCEDURE — 99213 OFFICE O/P EST LOW 20 MIN: CPT | Performed by: INTERNAL MEDICINE

## 2021-06-10 NOTE — PROGRESS NOTES
"Patient is a 20 y.o. female who is here for a possible ulcer.  Chief Complaint   Patient presents with   • Abdominal Pain     possible ulcer         HPI:    Patient c/o 2-3 month hx of epigastric pain.  No nausea.  Food may or may not exacerbate.  Worst with tomato products.  No diarrhea.  Some constipation.  Was rx PPI but started yet.  Has lost 4 pounds since last year.  Feels like a \"ball of heat\" in epigastric pain.  Carbonated alcohol worsens.    History:     Patient Active Problem List   Diagnosis   • Dyspepsia   • Mild intermittent asthma without complication   • Seasonal allergic rhinitis due to pollen   • Screening for lipid disorders       Past Medical History:   Diagnosis Date   • Asthma    • Depression    • GERD (gastroesophageal reflux disease)        Past Surgical History:   Procedure Laterality Date   • APPENDECTOMY  2012       Current Outpatient Medications on File Prior to Visit   Medication Sig   • albuterol sulfate  (90 Base) MCG/ACT inhaler Inhale 2 puffs Every 6 (Six) Hours As Needed for Wheezing or Shortness of Air.   • CETIRIZINE HCL PO Take  by mouth.   • ciprofloxacin (CIPRO) 500 MG tablet Take 1 tablet by mouth 2 (Two) Times a Day.   • dicyclomine (BENTYL) 20 MG tablet Take 1 tablet by mouth Every 8 (Eight) Hours As Needed (Stomach cramps).   • levonorgestrel (KYLEENA) 19.5 MG intrauterine device IUD 1 each by Intrauterine route 1 (One) Time.   • pantoprazole (PROTONIX) 40 MG EC tablet Take 1 tablet by mouth Every Morning Before Breakfast for 30 days.   • spironolactone (ALDACTONE) 50 MG tablet Take 50 mg by mouth Daily.   • tretinoin (ALTRALIN) 0.05 % gel Apply as directed nightly   • cetirizine (zyrTEC) 10 MG tablet Take 1 tablet by mouth Daily for 14 days.     No current facility-administered medications on file prior to visit.       Family History   Problem Relation Age of Onset   • Mental illness Mother    • Migraines Mother    • Mental illness Father    • Migraines Sister    • " Breast cancer Paternal Grandmother        Social History     Socioeconomic History   • Marital status: Single     Spouse name: Not on file   • Number of children: Not on file   • Years of education: Not on file   • Highest education level: Not on file   Tobacco Use   • Smoking status: Never Smoker   • Smokeless tobacco: Never Used   Vaping Use   • Vaping Use: Never used   Substance and Sexual Activity   • Alcohol use: Yes   • Drug use: Yes     Types: Marijuana   • Sexual activity: Yes     Partners: Male     Birth control/protection: I.U.D.         Review of Systems   Constitutional: Negative for chills, fatigue, fever and unexpected weight change.   HENT: Negative for congestion, ear pain, hearing loss, rhinorrhea, sinus pressure, sore throat and trouble swallowing.    Eyes: Negative for discharge and itching.   Respiratory: Positive for shortness of breath (with stress). Negative for cough and chest tightness.    Cardiovascular: Negative for chest pain, palpitations and leg swelling.   Gastrointestinal: Positive for abdominal pain (see HPI). Negative for blood in stool, constipation, diarrhea and vomiting.        See HPI   Endocrine: Positive for heat intolerance. Negative for polydipsia and polyuria.   Genitourinary: Negative for difficulty urinating, dysuria, enuresis, frequency, hematuria and urgency.        Hx of frequent UTI   Musculoskeletal: Negative for arthralgias, back pain, gait problem and joint swelling.   Skin: Negative for rash and wound.   Allergic/Immunologic: Positive for environmental allergies (seasonal). Negative for immunocompromised state.   Neurological: Negative for dizziness, syncope, weakness, light-headedness, numbness and headaches.   Hematological: Does not bruise/bleed easily.   Psychiatric/Behavioral: Positive for sleep disturbance. Negative for behavioral problems and dysphoric mood. The patient is nervous/anxious.        /64   Pulse 58   Temp 97.1 °F (36.2 °C) (Infrared)    "Ht 157.5 cm (62.01\")   Wt 50.3 kg (111 lb)   SpO2 97%   BMI 20.30 kg/m²       Physical Exam  Constitutional:       Appearance: Normal appearance. She is well-developed.   HENT:      Head: Normocephalic and atraumatic.      Right Ear: External ear normal.      Left Ear: External ear normal.      Nose: Nose normal.      Mouth/Throat:      Mouth: Mucous membranes are moist.      Pharynx: Oropharynx is clear.   Eyes:      Extraocular Movements: Extraocular movements intact.      Conjunctiva/sclera: Conjunctivae normal.      Pupils: Pupils are equal, round, and reactive to light.   Cardiovascular:      Rate and Rhythm: Normal rate and regular rhythm.      Heart sounds: Normal heart sounds.   Pulmonary:      Effort: Pulmonary effort is normal.      Breath sounds: Normal breath sounds.   Abdominal:      General: Bowel sounds are normal.      Palpations: Abdomen is soft.   Musculoskeletal:         General: Normal range of motion.      Cervical back: Normal range of motion and neck supple.   Lymphadenopathy:      Cervical: No cervical adenopathy.   Skin:     General: Skin is warm and dry.   Neurological:      General: No focal deficit present.      Mental Status: She is alert and oriented to person, place, and time.   Psychiatric:         Mood and Affect: Mood normal.         Behavior: Behavior normal.         Thought Content: Thought content normal.         Procedure:      Discussion/Summary:    Dyspepsia/epigastric tenderness-to start PPI, avoid NSAIDs, will arrange for EGD  ILDEFONSO-f/u with   Asthma/rhinitis-f/u with allergist     ER labs noted and dw patient, advised B12 500 mcg qd    Current Outpatient Medications:   •  albuterol sulfate  (90 Base) MCG/ACT inhaler, Inhale 2 puffs Every 6 (Six) Hours As Needed for Wheezing or Shortness of Air., Disp: 18 g, Rfl: 5  •  CETIRIZINE HCL PO, Take  by mouth., Disp: , Rfl:   •  ciprofloxacin (CIPRO) 500 MG tablet, Take 1 tablet by mouth 2 (Two) Times a Day., Disp: 10 " tablet, Rfl: 0  •  dicyclomine (BENTYL) 20 MG tablet, Take 1 tablet by mouth Every 8 (Eight) Hours As Needed (Stomach cramps)., Disp: 30 tablet, Rfl: 0  •  levonorgestrel (KYLEENA) 19.5 MG intrauterine device IUD, 1 each by Intrauterine route 1 (One) Time., Disp: , Rfl:   •  pantoprazole (PROTONIX) 40 MG EC tablet, Take 1 tablet by mouth Every Morning Before Breakfast for 30 days., Disp: 30 tablet, Rfl: 0  •  spironolactone (ALDACTONE) 50 MG tablet, Take 50 mg by mouth Daily., Disp: , Rfl:   •  tretinoin (ALTRALIN) 0.05 % gel, Apply as directed nightly, Disp: 45 g, Rfl: 5  •  cetirizine (zyrTEC) 10 MG tablet, Take 1 tablet by mouth Daily for 14 days., Disp: 14 tablet, Rfl: 0        Diagnoses and all orders for this visit:    1. Dyspepsia (Primary)  -     Ambulatory Referral to Gastroenterology

## 2021-06-11 ENCOUNTER — TELEPHONE (OUTPATIENT)
Dept: URGENT CARE | Facility: CLINIC | Age: 21
End: 2021-06-11

## 2021-06-11 NOTE — TELEPHONE ENCOUNTER
Staff has attempted to call patient to relay new test results. I called patient back regarding this message, no answer, LVMTCB

## 2021-07-26 ENCOUNTER — OFFICE VISIT (OUTPATIENT)
Dept: GASTROENTEROLOGY | Facility: CLINIC | Age: 21
End: 2021-07-26

## 2021-07-26 VITALS
WEIGHT: 110 LBS | DIASTOLIC BLOOD PRESSURE: 73 MMHG | SYSTOLIC BLOOD PRESSURE: 114 MMHG | BODY MASS INDEX: 20.11 KG/M2 | HEART RATE: 74 BPM

## 2021-07-26 DIAGNOSIS — R10.13 EPIGASTRIC PAIN: Primary | ICD-10-CM

## 2021-07-26 PROCEDURE — 99214 OFFICE O/P EST MOD 30 MIN: CPT | Performed by: NURSE PRACTITIONER

## 2021-07-26 RX ORDER — ALBUTEROL SULFATE 90 UG/1
2 AEROSOL, METERED RESPIRATORY (INHALATION) EVERY 6 HOURS PRN
Qty: 18 G | Refills: 5 | Status: SHIPPED | OUTPATIENT
Start: 2021-07-26 | End: 2021-08-19

## 2021-07-27 ENCOUNTER — PRIOR AUTHORIZATION (OUTPATIENT)
Dept: INTERNAL MEDICINE | Facility: CLINIC | Age: 21
End: 2021-07-27

## 2021-07-27 NOTE — PROGRESS NOTES
GASTROENTEROLOGY OFFICE NOTE  Cecilia Granger  [id number]  2000    CARE TEAM  Patient Care Team:  Chapincito Armenta MD as PCP - General (Internal Medicine)    Referring Provider: Chapincito Armenta MD    Chief Complaint   Patient presents with   • Abdominal Pain        HISTORY OF PRESENT ILLNESS:  Patient is a 21-year-old female who presents today with chronic acid reflux.  She reports she was diagnosed with reflux as a child, followed by pediatric GI.  For the past year she has had increased episodes of heartburn but within the last 2 to 3 months she has developed intermittent upper abdominal pain that feels like a lot of pressure in her upper abdomen that is sometimes a very hot sensation and stabbing.  The pain does occur on an empty stomach but when she eats it gets much worse.  She experiences early satiety and has nausea very frequently.  She has had black stool however, this was after taking many doses of Pepto-Bismol.  She has now stopped Pepto-Bismol and her stool has not noted any further black stool.    She was seen in the emergency department at [facility] for these complaints on 6/4/2021.  CT abdomen and pelvis with contrast showed no acute intra-abdominal abnormality.  She was started on pantoprazole 40 mg daily and followed up with her primary care doctor.  She had not noted any improvements in the frequency of her symptoms and pantoprazole was changed to omeprazole 40 mg daily.  She feels she has had some but very little relief from her symptoms with omeprazole.    She has further complaints of bloating and abdominal distention.  She feels that her stomach is full all the time even when she has not eaten.    PAST MEDICAL HISTORY  Past Medical History:   Diagnosis Date   • Asthma    • Depression    • GERD (gastroesophageal reflux disease)         PAST SURGICAL HISTORY  Past Surgical History:   Procedure Laterality Date   • APPENDECTOMY  2012        MEDICATIONS:    Current  Outpatient Medications:   •  CETIRIZINE HCL PO, Take  by mouth., Disp: , Rfl:   •  dicyclomine (BENTYL) 20 MG tablet, Take 1 tablet by mouth Every 8 (Eight) Hours As Needed (Stomach cramps)., Disp: 30 tablet, Rfl: 0  •  levonorgestrel (KYLEENA) 19.5 MG intrauterine device IUD, 1 each by Intrauterine route 1 (One) Time., Disp: , Rfl:   •  spironolactone (ALDACTONE) 50 MG tablet, Take 50 mg by mouth Daily., Disp: , Rfl:   •  albuterol sulfate  (90 Base) MCG/ACT inhaler, Inhale 2 puffs Every 6 (Six) Hours As Needed for Wheezing or Shortness of Air., Disp: 18 g, Rfl: 5  •  cetirizine (zyrTEC) 10 MG tablet, Take 1 tablet by mouth Daily for 14 days., Disp: 14 tablet, Rfl: 0    ALLERGIES  Allergies   Allergen Reactions   • Azithromycin Hives   • Cephalosporins Hives   • Clindamycin/Lincomycin Hives   • Flagyl [Metronidazole] Hives   • Macrobid [Nitrofurantoin] Hives   • Penicillins Hives   • Sulfa Antibiotics Hives       FAMILY HISTORY:  Family History   Problem Relation Age of Onset   • Mental illness Mother    • Migraines Mother    • Mental illness Father    • Migraines Sister    • Breast cancer Paternal Grandmother        SOCIAL HISTORY  Social History     Socioeconomic History   • Marital status: Single     Spouse name: Not on file   • Number of children: Not on file   • Years of education: Not on file   • Highest education level: Not on file   Tobacco Use   • Smoking status: Never Smoker   • Smokeless tobacco: Never Used   Vaping Use   • Vaping Use: Never used   Substance and Sexual Activity   • Alcohol use: Yes   • Drug use: Yes     Types: Marijuana   • Sexual activity: Yes     Partners: Male     Birth control/protection: I.U.D.       REVIEW OF SYSTEMS  Review of Systems   Constitutional: Negative for activity change, appetite change, chills, diaphoresis, fatigue, fever, unexpected weight gain and unexpected weight loss.   HENT: Negative for trouble swallowing and voice change.    Gastrointestinal: Positive  for abdominal distention, abdominal pain and nausea. Negative for anal bleeding, blood in stool, constipation, diarrhea, rectal pain, vomiting, GERD and indigestion.         PHYSICAL EXAM   /73   Pulse 74   Wt 49.9 kg (110 lb)   BMI 20.11 kg/m²   Physical Exam  Vitals and nursing note reviewed.   Constitutional:       Appearance: Normal appearance. She is well-developed.   HENT:      Head: Normocephalic and atraumatic.      Nose: Nose normal.      Mouth/Throat:      Mouth: Mucous membranes are moist.      Pharynx: Oropharynx is clear.   Eyes:      Pupils: Pupils are equal, round, and reactive to light.   Cardiovascular:      Rate and Rhythm: Normal rate and regular rhythm.   Pulmonary:      Effort: Pulmonary effort is normal.      Breath sounds: Normal breath sounds. No wheezing or rales.   Abdominal:      General: Bowel sounds are normal.      Palpations: Abdomen is soft. There is no mass.      Tenderness: There is abdominal tenderness in the epigastric area. There is no guarding or rebound.      Hernia: No hernia is present.   Musculoskeletal:         General: Normal range of motion.      Cervical back: Normal range of motion and neck supple.   Skin:     General: Skin is warm and dry.   Neurological:      Mental Status: She is alert and oriented to person, place, and time.      Cranial Nerves: No cranial nerve deficit.   Psychiatric:         Behavior: Behavior normal.         Judgment: Judgment normal.           Results Review:  CT Abdomen Pelvis With Contrast     Result Date: 6/4/2021  CT Abdomen Pelvis W INDICATION: Black tarry stool, constipation TECHNIQUE: CT of the abdomen and pelvis with IV contrast. Coronal and sagittal reconstructions were obtained.  Radiation dose reduction techniques included automated exposure control or exposure modulation based on body size. Count of known CT and cardiac nuc med studies performed in previous 12 months: 0. COMPARISON: None available. FINDINGS: Abdomen: Lung  bases are clear. Liver, spleen, kidneys and pancreas are unremarkable. There is a nonobstructing left renal stone. Pelvis: There has been prior appendectomy. There is an IUD and fluid in the pelvis. No definite bowel abnormality is appreciated. There are bilateral ovarian follicular cysts. cNo acute osseous abnormality.      No acute intra-abdominal abnormality. Signer Name: Savanah Chow MD  Signed: 6/4/2021 8:44 PM  Workstation Name: [id number]  [facility]    ASSESSMENT / PLAN  1.  Epigastric pain  -Continue omeprazole 40 mg daily  -EGD    Return for Follow up after procedures.    I discussed the patients findings and my recommendations with patient    MELANIA Brown

## 2021-08-16 PROCEDURE — U0004 COV-19 TEST NON-CDC HGH THRU: HCPCS | Performed by: NURSE PRACTITIONER

## 2021-08-17 ENCOUNTER — TELEPHONE (OUTPATIENT)
Dept: INTERNAL MEDICINE | Facility: CLINIC | Age: 21
End: 2021-08-17

## 2021-08-17 DIAGNOSIS — R05.9 COUGH: Primary | ICD-10-CM

## 2021-08-17 NOTE — TELEPHONE ENCOUNTER
Caller: Cecilia Granger    Relationship: Self    Best call back number: [phone]    What orders are you requesting (i.e. lab or imaging): X RAY OF CHEST     In what timeframe would the patient need to come in: ASAP    Where will you receive your lab/imaging services: [facility]     Additional notes: URGENT CARE TOLD PATIENT TO GET HER PCP TO PUT IN ORDERS BECAUSE THEY THINK THAT HER LUNGS HAVE DAMAGE FROM COVID.

## 2021-08-19 ENCOUNTER — OUTSIDE FACILITY SERVICE (OUTPATIENT)
Dept: GASTROENTEROLOGY | Facility: CLINIC | Age: 21
End: 2021-08-19

## 2021-08-19 PROCEDURE — 43239 EGD BIOPSY SINGLE/MULTIPLE: CPT | Performed by: INTERNAL MEDICINE

## 2021-08-19 RX ORDER — ALBUTEROL SULFATE 90 UG/1
2 AEROSOL, METERED RESPIRATORY (INHALATION) EVERY 4 HOURS PRN
Qty: 18 G | Refills: 3 | Status: SHIPPED | OUTPATIENT
Start: 2021-08-19 | End: 2022-03-28 | Stop reason: SDUPTHER

## 2021-08-30 ENCOUNTER — HOSPITAL ENCOUNTER (OUTPATIENT)
Dept: GENERAL RADIOLOGY | Facility: HOSPITAL | Age: 21
Discharge: HOME | End: 2021-08-30
Admitting: INTERNAL MEDICINE

## 2021-08-30 DIAGNOSIS — R05.9 COUGH: ICD-10-CM

## 2021-08-30 PROCEDURE — 71046 X-RAY EXAM CHEST 2 VIEWS: CPT

## 2021-09-09 ENCOUNTER — OFFICE VISIT (OUTPATIENT)
Dept: GASTROENTEROLOGY | Facility: CLINIC | Age: 21
End: 2021-09-09

## 2021-09-09 VITALS
TEMPERATURE: 97.5 F | WEIGHT: 109.4 LBS | BODY MASS INDEX: 20 KG/M2 | SYSTOLIC BLOOD PRESSURE: 123 MMHG | HEART RATE: 101 BPM | DIASTOLIC BLOOD PRESSURE: 70 MMHG

## 2021-09-09 DIAGNOSIS — K21.9 GASTROESOPHAGEAL REFLUX DISEASE WITHOUT ESOPHAGITIS: Primary | ICD-10-CM

## 2021-09-09 PROCEDURE — 99213 OFFICE O/P EST LOW 20 MIN: CPT | Performed by: NURSE PRACTITIONER

## 2021-09-09 RX ORDER — TRETINOIN 1 MG/G
CREAM TOPICAL
COMMUNITY
Start: 2021-09-08 | End: 2021-10-17

## 2021-09-09 RX ORDER — OMEPRAZOLE 40 MG/1
40 CAPSULE, DELAYED RELEASE ORAL DAILY
Qty: 90 CAPSULE | Refills: 3 | Status: SHIPPED | OUTPATIENT
Start: 2021-09-09 | End: 2021-12-02

## 2021-09-09 NOTE — PROGRESS NOTES
"GASTROENTEROLOGY OFFICE NOTE  Cecilia Granger  [id number]  2000    CARE TEAM  Patient Care Team:  Chapincito Armenta MD as PCP - General (Internal Medicine)    Referring Provider: Chapincito Armenta MD    Chief Complaint   Patient presents with   • Follow-up   • Abdominal Pain        HISTORY OF PRESENT ILLNESS:  Patient returns in follow-up status post EGD for complaints of worsening heartburn, intermittent upper abdominal pain, early satiety and nausea. EGD was unremarkable. Symptomatically, gastroparesis was suspected and she did a heavy meal challenge, eating late at night around 11 PM the night prior to EGD. There was no food residual in her stomach. Since the time of our initial consultation in July when we discussed the potential for gastroparesis, she has been eating small frequent meals rather than what she would consider a \"normal-sized meal\" and she has noted significant improvement in her symptoms. She is not had any abdominal pain, nausea or pressure in her upper abdomen with the exception of a couple of occasions when she has eaten a larger meal. She has discontinued use of PPI, she tells me that someone told they did not think it was contributing to her symptoms and that she should stop taking this. Since discontinuing omeprazole she has had heartburn on a daily basis.    She request today consultation with a dietitian to help her with better food choices related to not only GERD but foods that contribute to her bloating and abdominal pain. She has a history of an eating disorder of food avoidance.    PAST MEDICAL HISTORY  Past Medical History:   Diagnosis Date   • Asthma    • Depression    • GERD (gastroesophageal reflux disease)         PAST SURGICAL HISTORY  Past Surgical History:   Procedure Laterality Date   • APPENDECTOMY  2012   • ENDOSCOPY          MEDICATIONS:    Current Outpatient Medications:   •  albuterol sulfate  (90 Base) MCG/ACT inhaler, Inhale 2 puffs Every 4 (Four) Hours " As Needed for Wheezing., Disp: 18 g, Rfl: 3  •  cetirizine (zyrTEC) 10 MG tablet, Take 1 tablet by mouth Daily for 14 days., Disp: 14 tablet, Rfl: 0  •  CETIRIZINE HCL PO, Take  by mouth., Disp: , Rfl:   •  dicyclomine (BENTYL) 20 MG tablet, Take 1 tablet by mouth Every 8 (Eight) Hours As Needed (Stomach cramps)., Disp: 30 tablet, Rfl: 0  •  levonorgestrel (KYLEENA) 19.5 MG intrauterine device IUD, 1 each by Intrauterine route 1 (One) Time., Disp: , Rfl:   •  methylPREDNISolone (MEDROL) 4 MG dose pack, Take as directed on package instructions., Disp: 21 tablet, Rfl: 0  •  omeprazole (priLOSEC) 40 MG capsule, Take 1 capsule by mouth Daily., Disp: 90 capsule, Rfl: 3  •  spironolactone (ALDACTONE) 50 MG tablet, Take 50 mg by mouth Daily., Disp: , Rfl:   •  tretinoin (RETIN-A) 0.1 % cream, , Disp: , Rfl:     ALLERGIES  Allergies   Allergen Reactions   • Penicillins Hives, Itching, Rash and Swelling     Penicillins   • Azithromycin Hives, Itching and Rash   • Cephalosporins Hives and Rash     Cephalosporins   • Metronidazole Hives, Itching and Rash   • Nitrofurantoin Hives and Itching   • Sulfa Antibiotics Hives   • Clindamycin/Lincomycin Hives   • Vibramycin [Doxycycline] Hives and GI Intolerance     Hearing loss temporarily        FAMILY HISTORY:  Family History   Problem Relation Age of Onset   • Mental illness Mother    • Migraines Mother    • Mental illness Father    • Migraines Sister    • Breast cancer Paternal Grandmother    • Colon cancer Neg Hx    • Colon polyps Neg Hx        SOCIAL HISTORY  Social History     Socioeconomic History   • Marital status: Single     Spouse name: Not on file   • Number of children: Not on file   • Years of education: Not on file   • Highest education level: Not on file   Tobacco Use   • Smoking status: Never Smoker   • Smokeless tobacco: Never Used   Vaping Use   • Vaping Use: Never used   Substance and Sexual Activity   • Alcohol use: Yes   • Drug use: Yes     Types: Marijuana   •  Sexual activity: Yes     Partners: Male     Birth control/protection: I.U.D.       REVIEW OF SYSTEMS  Review of Systems   Constitutional: Negative for activity change, appetite change, chills, diaphoresis, fatigue, fever, unexpected weight gain and unexpected weight loss.   HENT: Negative for trouble swallowing and voice change.    Gastrointestinal: Positive for GERD. Negative for abdominal distention, abdominal pain, anal bleeding, blood in stool, constipation, diarrhea, nausea, rectal pain, vomiting and indigestion.         PHYSICAL EXAM   /70 (BP Location: Left arm, Patient Position: Sitting, Cuff Size: Adult)   Pulse 101   Temp 97.5 °F (36.4 °C) (Temporal)   Wt 49.6 kg (109 lb 6.4 oz)   BMI 20.00 kg/m²   Physical Exam  Constitutional:       General: She is not in acute distress.     Appearance: She is well-developed.   HENT:      Head: Normocephalic and atraumatic.      Nose: Nose normal.   Eyes:      Conjunctiva/sclera: Conjunctivae normal.      Pupils: Pupils are equal, round, and reactive to light.   Pulmonary:      Effort: Pulmonary effort is normal.   Abdominal:      General: Bowel sounds are normal. There is no distension.      Palpations: Abdomen is soft.   Neurological:      Mental Status: She is alert and oriented to person, place, and time.   Psychiatric:         Behavior: Behavior normal.         Judgment: Judgment normal.         ASSESSMENT / PLAN  1. GERD  -Omeprazole 40 mg daily  -Referral to dietitian    Return if symptoms worsen or fail to improve.    I discussed the patients findings and my recommendations with patient    MELANIA Brown

## 2021-09-10 ENCOUNTER — TELEPHONE (OUTPATIENT)
Dept: INTERNAL MEDICINE | Facility: CLINIC | Age: 21
End: 2021-09-10

## 2021-09-10 DIAGNOSIS — J01.80 OTHER SUBACUTE SINUSITIS: Primary | ICD-10-CM

## 2021-09-10 RX ORDER — LEVOFLOXACIN 500 MG/1
500 TABLET, FILM COATED ORAL DAILY
Qty: 10 TABLET | Refills: 0 | Status: SHIPPED | OUTPATIENT
Start: 2021-09-10 | End: 2021-10-17

## 2021-09-10 NOTE — TELEPHONE ENCOUNTER
Caller: Cecilia Granger    Relationship: Self    Best call back number: [phone]    What medication are you requesting: CIPRO    What are your current symptoms: SINUS INFECTION    How long have you been experiencing symptoms: FOR ABOUT 2 MONTHS    Have you had these symptoms before:    [x] Yes  [] No    Have you been treated for these symptoms before:   [x] Yes  [] No    If a prescription is needed, what is your preferred pharmacy and phone number:    [facility] [phone]  Additional notes:[facility] AND THEY DIAGNOSED HER WITH A SINUS INFECTION. THEY PRESCRIBED DOXYCYCLINE AND SHE HAD AN ALLERGIC REACTION TO IT. SHE TRIED CALLING [facility] TO GET CIPRO IN REPLACEMENT HOWEVER CAN NOT GET A HOLD OF ANYONE. PATIENT IS ASKING DR PUCKETT IF HE COULD PRESCRIBE CIPRO. PLEASE ADVISE

## 2021-10-17 PROCEDURE — 87798 DETECT AGENT NOS DNA AMP: CPT | Performed by: NURSE PRACTITIONER

## 2021-10-17 PROCEDURE — 87801 DETECT AGNT MULT DNA AMPLI: CPT | Performed by: NURSE PRACTITIONER

## 2021-10-17 PROCEDURE — 87529 HSV DNA AMP PROBE: CPT | Performed by: NURSE PRACTITIONER

## 2021-10-17 PROCEDURE — 87591 N.GONORRHOEAE DNA AMP PROB: CPT | Performed by: NURSE PRACTITIONER

## 2021-10-17 PROCEDURE — 87491 CHLMYD TRACH DNA AMP PROBE: CPT | Performed by: NURSE PRACTITIONER

## 2021-10-17 PROCEDURE — 87661 TRICHOMONAS VAGINALIS AMPLIF: CPT | Performed by: NURSE PRACTITIONER

## 2021-12-02 ENCOUNTER — OFFICE VISIT (OUTPATIENT)
Dept: INTERNAL MEDICINE | Facility: CLINIC | Age: 21
End: 2021-12-02

## 2021-12-02 VITALS
OXYGEN SATURATION: 98 % | TEMPERATURE: 96.8 F | BODY MASS INDEX: 20.11 KG/M2 | HEIGHT: 62 IN | HEART RATE: 80 BPM | SYSTOLIC BLOOD PRESSURE: 118 MMHG | DIASTOLIC BLOOD PRESSURE: 72 MMHG

## 2021-12-02 DIAGNOSIS — J32.8 OTHER CHRONIC SINUSITIS: ICD-10-CM

## 2021-12-02 DIAGNOSIS — J45.20 MILD INTERMITTENT ASTHMA WITHOUT COMPLICATION: ICD-10-CM

## 2021-12-02 DIAGNOSIS — Z88.9 MULTIPLE DRUG ALLERGIES: ICD-10-CM

## 2021-12-02 DIAGNOSIS — J30.1 SEASONAL ALLERGIC RHINITIS DUE TO POLLEN: Primary | ICD-10-CM

## 2021-12-02 LAB
EXPIRATION DATE: NORMAL
FLUAV AG NPH QL: NEGATIVE
FLUBV AG NPH QL: NEGATIVE
INTERNAL CONTROL: NORMAL
Lab: NORMAL

## 2021-12-02 PROCEDURE — 99214 OFFICE O/P EST MOD 30 MIN: CPT | Performed by: INTERNAL MEDICINE

## 2021-12-02 PROCEDURE — 87804 INFLUENZA ASSAY W/OPTIC: CPT | Performed by: INTERNAL MEDICINE

## 2021-12-02 NOTE — PROGRESS NOTES
Patient is a 21 y.o. female who is here for a sinus infection.  Chief Complaint   Patient presents with   • Sinusitis         HPI:    Here for mgmt of chronic sinus pressure.  Has clear yellow and chunks of blood.  Has foul odor.  Was seen by ENT for allergy testing.  Had Cipro in the past without help.  Covid testing is still pending.  Some chills and sweats.  Some coughing and SOB. Has 4 cats that live in her home.    History:     Patient Active Problem List   Diagnosis   • Dyspepsia   • Mild intermittent asthma without complication   • Seasonal allergic rhinitis due to pollen   • Screening for lipid disorders   • Other chronic sinusitis       Past Medical History:   Diagnosis Date   • Asthma    • Depression    • GERD (gastroesophageal reflux disease)        Past Surgical History:   Procedure Laterality Date   • APPENDECTOMY  2012   • ENDOSCOPY         Current Outpatient Medications on File Prior to Visit   Medication Sig   • albuterol sulfate  (90 Base) MCG/ACT inhaler Inhale 2 puffs Every 4 (Four) Hours As Needed for Wheezing.   • levonorgestrel (KYLEENA) 19.5 MG intrauterine device IUD 1 each by Intrauterine route 1 (One) Time.   • cetirizine (zyrTEC) 10 MG tablet Take 1 tablet by mouth Daily for 14 days.   • fluconazole (Diflucan) 150 MG tablet Take 1 tablet by mouth Every 72 (Seventy-Two) Hours As Needed (vaginal itching).   • [DISCONTINUED] ciprofloxacin (CIPRO) 500 MG tablet Take 1 tablet by mouth 2 (Two) Times a Day.   • [DISCONTINUED] dicyclomine (BENTYL) 20 MG tablet Take 1 tablet by mouth Every 8 (Eight) Hours As Needed (Stomach cramps).   • [DISCONTINUED] mometasone-formoterol (Dulera) 50-5 MCG/ACT inhaler Every 12 (Twelve) Hours.   • [DISCONTINUED] mupirocin (BACTROBAN) 2 % ointment Apply 1 application topically to the appropriate area as directed 3 (Three) Times a Day.   • [DISCONTINUED] omeprazole (priLOSEC) 40 MG capsule Take 1 capsule by mouth Daily.   • [DISCONTINUED] spironolactone  (ALDACTONE) 50 MG tablet Take 50 mg by mouth Daily.     No current facility-administered medications on file prior to visit.       Family History   Problem Relation Age of Onset   • Mental illness Mother    • Migraines Mother    • Mental illness Father    • Migraines Sister    • Breast cancer Paternal Grandmother    • Colon cancer Neg Hx    • Colon polyps Neg Hx        Social History     Socioeconomic History   • Marital status: Single   Tobacco Use   • Smoking status: Never Smoker   • Smokeless tobacco: Never Used   Vaping Use   • Vaping Use: Never used   Substance and Sexual Activity   • Alcohol use: Yes   • Drug use: Yes     Types: Marijuana   • Sexual activity: Yes     Partners: Male     Birth control/protection: I.U.D.         Review of Systems   Constitutional: Positive for fatigue. Negative for chills, fever and unexpected weight change.   HENT: Positive for congestion, sinus pressure and sinus pain. Negative for ear pain, hearing loss, rhinorrhea, sore throat and trouble swallowing.    Eyes: Negative for discharge and itching.   Respiratory: Positive for shortness of breath (with stress). Negative for cough and chest tightness.    Cardiovascular: Negative for chest pain, palpitations and leg swelling.   Gastrointestinal: Negative for blood in stool, constipation, diarrhea and vomiting.        See HPI   Endocrine: Negative for polydipsia and polyuria.   Genitourinary: Negative for difficulty urinating, dysuria, enuresis, frequency, hematuria and urgency.        Hx of frequent UTI   Musculoskeletal: Positive for arthralgias. Negative for back pain, gait problem and joint swelling.   Skin: Negative for rash and wound.   Allergic/Immunologic: Positive for environmental allergies (seasonal). Negative for immunocompromised state.   Neurological: Negative for dizziness, syncope, weakness, light-headedness, numbness and headaches.   Hematological: Does not bruise/bleed easily.   Psychiatric/Behavioral: Positive for  "sleep disturbance. Negative for behavioral problems and dysphoric mood. The patient is nervous/anxious.        /72 (BP Location: Left arm, Patient Position: Sitting)   Pulse 80   Temp 96.8 °F (36 °C) (Infrared)   Ht 157.5 cm (62.01\")   SpO2 98%   BMI 20.11 kg/m²       Physical Exam  Constitutional:       Appearance: Normal appearance. She is well-developed.   HENT:      Head: Normocephalic and atraumatic.      Comments: Maxillary/frontal tenderness     Right Ear: External ear normal.      Left Ear: External ear normal.      Nose: Nose normal.      Mouth/Throat:      Mouth: Mucous membranes are moist.      Pharynx: Oropharynx is clear.   Eyes:      Extraocular Movements: Extraocular movements intact.      Conjunctiva/sclera: Conjunctivae normal.      Pupils: Pupils are equal, round, and reactive to light.   Cardiovascular:      Rate and Rhythm: Normal rate and regular rhythm.      Heart sounds: Normal heart sounds.   Pulmonary:      Effort: Pulmonary effort is normal.      Breath sounds: Normal breath sounds.   Abdominal:      General: Bowel sounds are normal.      Palpations: Abdomen is soft.   Musculoskeletal:         General: Normal range of motion.      Cervical back: Normal range of motion and neck supple.   Lymphadenopathy:      Cervical: No cervical adenopathy.   Skin:     General: Skin is warm and dry.   Neurological:      General: No focal deficit present.      Mental Status: She is alert and oriented to person, place, and time.   Psychiatric:         Mood and Affect: Mood normal.         Behavior: Behavior normal.         Thought Content: Thought content normal.         Procedure:      Discussion/Summary:    ILDEFONSO-f/u with [facility]  Asthma/rhinitis-f/u with allergist  Chronic sinusitis-advised need to avoid cats, will check CT Sinus and if needed may need antibiotic sinus rinse         Current Outpatient Medications:   •  albuterol sulfate  (90 Base) MCG/ACT inhaler, Inhale 2 puffs Every 4 (Four) " Hours As Needed for Wheezing., Disp: 18 g, Rfl: 3  •  levonorgestrel (KYLEENA) 19.5 MG intrauterine device IUD, 1 each by Intrauterine route 1 (One) Time., Disp: , Rfl:   •  cetirizine (zyrTEC) 10 MG tablet, Take 1 tablet by mouth Daily for 14 days., Disp: 14 tablet, Rfl: 0  •  fluconazole (Diflucan) 150 MG tablet, Take 1 tablet by mouth Every 72 (Seventy-Two) Hours As Needed (vaginal itching)., Disp: 2 tablet, Rfl: 0        Diagnoses and all orders for this visit:    1. Seasonal allergic rhinitis due to pollen (Primary)    2. Mild intermittent asthma without complication    3. Other chronic sinusitis  -     CT Sinus Without Contrast  -     POC Influenza A / B  -     Ambulatory Referral to Allergy    4. Multiple drug allergies  -     Ambulatory Referral to Allergy

## 2021-12-03 ENCOUNTER — HOSPITAL ENCOUNTER (OUTPATIENT)
Dept: CT IMAGING | Facility: HOSPITAL | Age: 21
Discharge: HOME | End: 2021-12-03
Admitting: INTERNAL MEDICINE

## 2021-12-03 PROCEDURE — 70486 CT MAXILLOFACIAL W/O DYE: CPT

## 2022-02-05 PROCEDURE — U0004 COV-19 TEST NON-CDC HGH THRU: HCPCS | Performed by: FAMILY MEDICINE

## 2022-03-10 ENCOUNTER — HOSPITAL ENCOUNTER (OUTPATIENT)
Dept: GENERAL RADIOLOGY | Facility: HOSPITAL | Age: 22
Discharge: HOME | End: 2022-03-10
Admitting: STUDENT IN AN ORGANIZED HEALTH CARE EDUCATION/TRAINING PROGRAM

## 2022-03-10 ENCOUNTER — TRANSCRIBE ORDERS (OUTPATIENT)
Dept: ADMINISTRATIVE | Facility: HOSPITAL | Age: 22
End: 2022-03-10

## 2022-03-10 DIAGNOSIS — R07.81 PLEURITIC CHEST PAIN: Primary | ICD-10-CM

## 2022-03-10 DIAGNOSIS — R05.3 CHRONIC COUGH: ICD-10-CM

## 2022-03-10 PROCEDURE — 71046 X-RAY EXAM CHEST 2 VIEWS: CPT

## 2022-03-28 ENCOUNTER — OFFICE VISIT (OUTPATIENT)
Dept: PULMONOLOGY | Facility: CLINIC | Age: 22
End: 2022-03-28

## 2022-03-28 VITALS
SYSTOLIC BLOOD PRESSURE: 104 MMHG | OXYGEN SATURATION: 99 % | RESPIRATION RATE: 18 BRPM | HEIGHT: 62 IN | DIASTOLIC BLOOD PRESSURE: 74 MMHG | HEART RATE: 88 BPM | BODY MASS INDEX: 21.44 KG/M2 | TEMPERATURE: 97.3 F | WEIGHT: 116.5 LBS

## 2022-03-28 DIAGNOSIS — J45.40 MODERATE PERSISTENT ASTHMA WITHOUT COMPLICATION: Primary | ICD-10-CM

## 2022-03-28 DIAGNOSIS — K21.9 GASTROESOPHAGEAL REFLUX DISEASE WITHOUT ESOPHAGITIS: ICD-10-CM

## 2022-03-28 PROCEDURE — 99204 OFFICE O/P NEW MOD 45 MIN: CPT | Performed by: INTERNAL MEDICINE

## 2022-03-28 RX ORDER — ALBUTEROL SULFATE 90 UG/1
2 AEROSOL, METERED RESPIRATORY (INHALATION) EVERY 4 HOURS PRN
Qty: 18 G | Refills: 3 | Status: SHIPPED | OUTPATIENT
Start: 2022-03-28 | End: 2022-06-16 | Stop reason: SDUPTHER

## 2022-03-28 NOTE — PROGRESS NOTES
Initial Pulmonary Consult Note    Subjective   Reason for consultation/Chief Complaint: Cough    Cecilia Granger is a 21 y.o. female is being seen for consultation today at the request of Elzbieta Mojica MD    History of Present Illness    Ms. Granger is a 20yo F with a history of asthma who is referred for recurrent bronchitis since she had COVID in March 2021. She has been seen by ENT and an allergist and diagnosed with costochondritis. She has been taking Naproxen twice daily with some improvement. She has Albuterol which helps some. She did use her friend's maintenance inhaler recently which helped with her cough. She also has acid reflux and gastroparesis. She does not eat too close to bedtime.     Active Ambulatory Problems     Diagnosis Date Noted   • Dyspepsia 06/03/2020   • Moderate persistent asthma without complication 06/03/2020   • Seasonal allergic rhinitis due to pollen 06/03/2020   • Screening for lipid disorders 06/22/2020   • Other chronic sinusitis 12/02/2021   • Gastroesophageal reflux disease without esophagitis 03/28/2022     Resolved Ambulatory Problems     Diagnosis Date Noted   • No Resolved Ambulatory Problems     Past Medical History:   Diagnosis Date   • Asthma    • Depression    • GERD (gastroesophageal reflux disease)        Past Surgical History:   Procedure Laterality Date   • APPENDECTOMY  2012   • ENDOSCOPY         Family History   Problem Relation Age of Onset   • Mental illness Mother    • Migraines Mother    • Mental illness Father    • Migraines Sister    • Breast cancer Paternal Grandmother    • Colon cancer Neg Hx    • Colon polyps Neg Hx        Social History     Socioeconomic History   • Marital status: Single   Tobacco Use   • Smoking status: Never Smoker   • Smokeless tobacco: Never Used   Vaping Use   • Vaping Use: Never used   Substance and Sexual Activity   • Alcohol use: Yes   • Drug use: Yes     Types: Marijuana   • Sexual activity: Yes     Partners: Male     Birth  control/protection: I.U.D.       Allergies   Allergen Reactions   • Penicillins Hives, Itching, Rash and Swelling     Penicillins   • Azithromycin Hives, Itching and Rash   • Cephalosporins Hives and Rash     Cephalosporins   • Metronidazole Hives, Itching and Rash   • Nitrofurantoin Hives and Itching   • Sulfa Antibiotics Hives   • Clindamycin/Lincomycin Hives   • Vibramycin [Doxycycline] Hives and GI Intolerance     Hearing loss temporarily        Current Outpatient Medications   Medication Sig Dispense Refill   • albuterol sulfate  (90 Base) MCG/ACT inhaler Inhale 2 puffs Every 4 (Four) Hours As Needed for Wheezing. 18 g 3   • levonorgestrel (KYLEENA) 19.5 MG intrauterine device IUD 1 each by Intrauterine route 1 (One) Time.     • cetirizine (zyrTEC) 10 MG tablet Take 1 tablet by mouth Daily for 14 days. 14 tablet 0   • Fluticasone Furoate-Vilanterol (BREO ELLIPTA) 200-25 MCG/INH inhaler Inhale 1 puff Daily. 1 each 11     No current facility-administered medications for this visit.       Review of Systems   Constitutional: Positive for appetite change and fatigue.   HENT: Positive for congestion, dental problem, facial swelling, postnasal drip, rhinorrhea, sinus pressure, sneezing and sore throat.    Eyes: Negative.    Respiratory: Positive for apnea, cough, choking, chest tightness, shortness of breath and wheezing.    Cardiovascular: Positive for chest pain and palpitations.   Gastrointestinal: Positive for abdominal pain and nausea.   Endocrine: Negative.    Genitourinary: Negative.    Musculoskeletal: Positive for back pain and myalgias.   Skin: Negative.    Allergic/Immunologic: Positive for environmental allergies and food allergies.   Neurological: Negative.    Hematological: Negative.    Psychiatric/Behavioral: Positive for decreased concentration and dysphoric mood. The patient is nervous/anxious.          Objective   Blood pressure 104/74, pulse 88, temperature 97.3 °F (36.3 °C), resp. rate 18,  "height 157.5 cm (62\"), weight 52.8 kg (116 lb 8 oz), SpO2 99 %.  Physical Exam  Vitals and nursing note reviewed.   Constitutional:       General: She is not in acute distress.     Appearance: She is well-developed.   HENT:      Head: Normocephalic and atraumatic.   Eyes:      General: No scleral icterus.     Conjunctiva/sclera: Conjunctivae normal.      Pupils: Pupils are equal, round, and reactive to light.   Neck:      Thyroid: No thyromegaly.      Trachea: No tracheal deviation.   Cardiovascular:      Rate and Rhythm: Normal rate and regular rhythm.      Heart sounds: Normal heart sounds.   Pulmonary:      Effort: Pulmonary effort is normal. No respiratory distress.      Breath sounds: Normal breath sounds.   Abdominal:      General: Bowel sounds are normal.      Palpations: Abdomen is soft.      Tenderness: There is no abdominal tenderness.   Musculoskeletal:         General: Normal range of motion.      Cervical back: Normal range of motion and neck supple.   Lymphadenopathy:      Cervical: No cervical adenopathy.   Skin:     General: Skin is warm and dry.      Findings: No erythema or rash.   Neurological:      Mental Status: She is alert and oriented to person, place, and time.      Motor: No abnormal muscle tone.      Coordination: Coordination normal.   Psychiatric:         Speech: Speech normal.         Behavior: Behavior normal.         Judgment: Judgment normal.         PFTs:  No PFTs available.     Imaging:  Chest xray from 3/10/22 reviewed. There is no focal airspace opacity. There is no pleural effusion or pneumothorax.     Assessment/Plan   Diagnoses and all orders for this visit:    1. Moderate persistent asthma without complication (Primary)    2. Gastroesophageal reflux disease without esophagitis    Other orders  -     Fluticasone Furoate-Vilanterol (BREO ELLIPTA) 200-25 MCG/INH inhaler; Inhale 1 puff Daily.  Dispense: 1 each; Refill: 11  -     albuterol sulfate  (90 Base) MCG/ACT " inhaler; Inhale 2 puffs Every 4 (Four) Hours As Needed for Wheezing.  Dispense: 18 g; Refill: 3        Discussion:  Ms. Granger is a 21o F who is referred for cough.     1. Moderate Persistent Asthma  - We will start her on Breo 200 once daily as a maintenance inhaler.   - Albuterol as needed. Refill sent to pharmacy.   - If she continues to remain symptomatic, we will need to check labs to see if she qualifies for biologic therapy.     2. GERD  - This may be contributing to her cough  - Reflux precautions.     Follow up in 2 months.        I have spent 47 minutes reviewing the patient record, face to face with the patient in discussion of test results and plans for further management and in documentation and coordination of care.       Shellie RAGSDALE Case, DO  Pulmonary and Critical Care Medicine  Note Electronically Signed

## 2022-05-10 PROBLEM — R94.31 ABNORMAL ECG: Status: ACTIVE | Noted: 2022-05-10

## 2022-06-16 RX ORDER — ALBUTEROL SULFATE 90 UG/1
2 AEROSOL, METERED RESPIRATORY (INHALATION) EVERY 4 HOURS PRN
Qty: 18 G | Refills: 3 | Status: SHIPPED | OUTPATIENT
Start: 2022-06-16

## 2022-06-16 NOTE — TELEPHONE ENCOUNTER
Caller: Cecilia Granger    Relationship: Self    Best call back number: [phone]     Requested Prescriptions:   Requested Prescriptions     Pending Prescriptions Disp Refills   • albuterol sulfate  (90 Base) MCG/ACT inhaler 18 g 3     Sig: Inhale 2 puffs Every 4 (Four) Hours As Needed for Wheezing.        Pharmacy where request should be sent: [facility]/PHARMACY #[id number] - [address] [phone] [facility] [phone]      Additional details provided by patient:  LOST INHALER AND IS OUT AND IS HAVING WHEEZING    Does the patient have less than a 3 day supply:  [x] Yes  [] No    Alexandra KELLER Rep   06/16/22 13:15 EDT

## 2022-06-21 ENCOUNTER — OFFICE VISIT (OUTPATIENT)
Dept: CARDIOLOGY | Facility: CLINIC | Age: 22
End: 2022-06-21

## 2022-06-21 VITALS
HEIGHT: 62 IN | WEIGHT: 120 LBS | OXYGEN SATURATION: 98 % | SYSTOLIC BLOOD PRESSURE: 110 MMHG | DIASTOLIC BLOOD PRESSURE: 66 MMHG | HEART RATE: 98 BPM | BODY MASS INDEX: 22.08 KG/M2

## 2022-06-21 DIAGNOSIS — R94.31 ABNORMAL ECG: Primary | ICD-10-CM

## 2022-06-21 DIAGNOSIS — R00.2 PALPITATIONS: ICD-10-CM

## 2022-06-21 PROCEDURE — 93000 ELECTROCARDIOGRAM COMPLETE: CPT | Performed by: INTERNAL MEDICINE

## 2022-06-21 PROCEDURE — 99204 OFFICE O/P NEW MOD 45 MIN: CPT | Performed by: INTERNAL MEDICINE

## 2022-06-21 RX ORDER — TRETINOIN 0.25 MG/G
GEL TOPICAL NIGHTLY
COMMUNITY

## 2022-06-21 RX ORDER — NAPROXEN 500 MG/1
TABLET ORAL DAILY
COMMUNITY

## 2022-06-21 NOTE — PROGRESS NOTES
[facility]  Follow Up Visit  Cecilia Granger  2000    VISIT DATE:  06/30/22    PCP:   Chapincito Armenta MD  [address]  [facility] [address]          CC:  Abnormal ECG and Chest Pain      Problem List:  1.  Prior COVID infection  2.  Inappropriate tachycardia as a child  3.  Asthma  4.  GERD    Holter monitoring from :  Pending at time of this visit    History of Present Illness:  Cecilia Granger  Is a 22 y.o. female with pertinent cardiac history detailed above.  Patient referred by abnormal EKG.  There is a scanned EKG in her chart that has artifact from 2022 but I do not appreciate any definitive abnormalities.  EKG today WNL.  Patient has been having intermittent CP that she describes it as aching and cramping.  She states it feels similar to her prior costochondritis.  Heart flutters and palpitationsare also issues she has been having but they occur separate from the pain.  The palpitations did cause shortness of breath.  These started in March.  However she also notes both get worse with exertion and when she has been outside with her friends she has had to stop what she is doing because of these complaints.  She did have an evaluation as a child for inappropriate sinus tachycardia but states that that self resolved.      Patient Active Problem List    Diagnosis Date Noted   • Abnormal ECG 05/10/2022   • Gastroesophageal reflux disease without esophagitis 03/28/2022   • Other chronic sinusitis 12/02/2021   • Screening for lipid disorders 06/22/2020   • Dyspepsia 06/03/2020   • Moderate persistent asthma without complication 06/03/2020   • Seasonal allergic rhinitis due to pollen 06/03/2020       Allergies   Allergen Reactions   • Penicillins Hives, Itching, Rash and Swelling     Penicillins   • Azithromycin Hives, Itching and Rash   • Cephalosporins Hives and Rash     Cephalosporins   • Metronidazole Hives, Itching and Rash   • Nitrofurantoin Hives and  "Itching   • Sulfa Antibiotics Hives   • Clindamycin/Lincomycin Hives   • Vibramycin [Doxycycline] Hives and GI Intolerance     Hearing loss temporarily        Social History     Socioeconomic History   • Marital status: Single   Tobacco Use   • Smoking status: Never Smoker   • Smokeless tobacco: Never Used   Vaping Use   • Vaping Use: Never used   Substance and Sexual Activity   • Alcohol use: Yes   • Drug use: Yes     Types: Marijuana   • Sexual activity: Yes     Partners: Male     Birth control/protection: I.U.D.       Family History   Problem Relation Age of Onset   • Mental illness Mother    • Migraines Mother    • Supraventricular tachycardia Mother    • Mental illness Father    • Migraines Sister    • Asthma Sister    • Breast cancer Paternal Grandmother    • Colon cancer Neg Hx    • Colon polyps Neg Hx        Current Medications:    Current Outpatient Medications:   •  albuterol sulfate  (90 Base) MCG/ACT inhaler, Inhale 2 puffs Every 4 (Four) Hours As Needed for Wheezing., Disp: 18 g, Rfl: 3  •  cetirizine (zyrTEC) 10 MG tablet, Take 1 tablet by mouth Daily for 14 days., Disp: 14 tablet, Rfl: 0  •  Fluticasone Furoate-Vilanterol (BREO ELLIPTA) 200-25 MCG/INH inhaler, Inhale 1 puff Daily., Disp: 1 each, Rfl: 11  •  levonorgestrel (KYLEENA) 19.5 MG intrauterine device IUD, 1 each by Intrauterine route 1 (One) Time., Disp: , Rfl:   •  naproxen (EC NAPROSYN) 500 MG EC tablet, Daily., Disp: , Rfl:   •  tretinoin (RETIN-A) 0.025 % gel, Apply  topically to the appropriate area as directed Every Night., Disp: , Rfl:      Review of Systems   Cardiovascular: Positive for chest pain, dyspnea on exertion and palpitations. Negative for irregular heartbeat, leg swelling and syncope.       Vitals:    06/21/22 1314   BP: 110/66   BP Location: Right arm   Patient Position: Sitting   Pulse: 98   SpO2: 98%   Weight: 54.4 kg (120 lb)   Height: 157.5 cm (62\")       Physical Exam  Constitutional:       Appearance: Normal " appearance.   Neck:      Vascular: No carotid bruit.   Cardiovascular:      Rate and Rhythm: Normal rate and regular rhythm.      Pulses: Normal pulses.      Heart sounds: Normal heart sounds.   Pulmonary:      Effort: Pulmonary effort is normal.      Breath sounds: Normal breath sounds.   Musculoskeletal:      Right lower leg: No edema.      Left lower leg: No edema.   Skin:     General: Skin is warm and dry.   Neurological:      General: No focal deficit present.      Mental Status: She is alert.         Diagnostic Data:    ECG 12 Lead    Date/Time: 6/21/2022 1:26 PM  Performed by: Kale Lorenz MD  Authorized by: Kale Lorenz MD   Comparison: compared with previous ECG from 3/9/2022  Similar to previous ECG  Rhythm: sinus rhythm  Rate: normal  BPM: 83  QRS axis: normal    Clinical impression: normal ECG          Lab Results   Component Value Date    CHLPL 135 05/31/2016    TRIG 42 06/22/2020    HDL 64 (H) 06/22/2020     Lab Results   Component Value Date    GLUCOSE 88 06/04/2021    BUN 9 06/04/2021    CREATININE 0.84 06/04/2021     06/04/2021    K 4.4 06/04/2021     06/04/2021    CO2 29.0 06/04/2021     No results found for: HGBA1C  Lab Results   Component Value Date    WBC 7.41 06/04/2021    HGB 14.2 06/04/2021    HCT 42.8 06/04/2021     06/04/2021       Assessment:   Diagnosis Plan   1. Abnormal ECG  ECG 12 Lead   2. Palpitations  Adult Transthoracic Echo Complete W/ Cont if Necessary Per Protocol    Treadmill Stress Test       Plan:      1. CP, palpitations  -EKG in office WNL  -will request monitor records from [facility]  -Patient has reported both chest pain and palpitations that are worse with exertion  -Does not have any major cardiac risk factors  -We will get an echocardiogram screen for any structural abnormalities can have her do an exercise treadmill to look for any provokable arrhythmias    Follow-up after above testing completed    Addendum reviewed Holter monitor  from  and shows sinus tachycardia but no other arrhythmias      Kale Lorenz MD [facility]

## 2022-07-11 ENCOUNTER — HOSPITAL ENCOUNTER (OUTPATIENT)
Dept: CARDIOLOGY | Facility: HOSPITAL | Age: 22
End: 2022-07-11

## 2022-07-11 ENCOUNTER — APPOINTMENT (OUTPATIENT)
Dept: CARDIOLOGY | Facility: HOSPITAL | Age: 22
End: 2022-07-11

## 2023-05-19 PROCEDURE — 87186 SC STD MICRODIL/AGAR DIL: CPT | Performed by: FAMILY MEDICINE

## 2023-05-19 PROCEDURE — 87086 URINE CULTURE/COLONY COUNT: CPT | Performed by: FAMILY MEDICINE

## 2023-05-19 PROCEDURE — 87077 CULTURE AEROBIC IDENTIFY: CPT | Performed by: FAMILY MEDICINE

## 2023-06-05 ENCOUNTER — TELEPHONE (OUTPATIENT)
Dept: INTERNAL MEDICINE | Facility: CLINIC | Age: 23
End: 2023-06-05
Payer: COMMERCIAL

## 2023-06-05 NOTE — TELEPHONE ENCOUNTER
PATIENT WENT TO URGENT CARE AND IS BEING TREATED FOR A UTI.  NEEDS DIFLUCAN.  HAS DONE 2 DIFLUCAN AND YEAST IS NOT GETTING BETTER.  WHAT TO DO?    [facility]    PLEASE CALL [phone]

## 2023-10-03 ENCOUNTER — LAB (OUTPATIENT)
Dept: LAB | Facility: HOSPITAL | Age: 23
End: 2023-10-03
Payer: COMMERCIAL

## 2023-10-03 ENCOUNTER — TRANSCRIBE ORDERS (OUTPATIENT)
Dept: LAB | Facility: HOSPITAL | Age: 23
End: 2023-10-03
Payer: COMMERCIAL

## 2023-10-03 DIAGNOSIS — L70.0 NODULAR ELASTOSIS WITH CYSTS AND COMEDONES OF FAVRE AND RACOUCHOT: Primary | ICD-10-CM

## 2023-10-03 DIAGNOSIS — L70.0 NODULAR ELASTOSIS WITH CYSTS AND COMEDONES OF FAVRE AND RACOUCHOT: ICD-10-CM

## 2023-10-03 DIAGNOSIS — L57.8 NODULAR ELASTOSIS WITH CYSTS AND COMEDONES OF FAVRE AND RACOUCHOT: Primary | ICD-10-CM

## 2023-10-03 DIAGNOSIS — L57.8 NODULAR ELASTOSIS WITH CYSTS AND COMEDONES OF FAVRE AND RACOUCHOT: ICD-10-CM

## 2023-10-03 LAB
ALBUMIN SERPL-MCNC: 4.2 G/DL (ref 3.5–5.2)
ALP SERPL-CCNC: 86 U/L (ref 39–117)
ALT SERPL W P-5'-P-CCNC: 10 U/L (ref 1–33)
AST SERPL-CCNC: 19 U/L (ref 1–32)
BASOPHILS # BLD AUTO: 0.06 10*3/MM3 (ref 0–0.2)
BASOPHILS NFR BLD AUTO: 0.9 % (ref 0–1.5)
BILIRUB CONJ SERPL-MCNC: <0.2 MG/DL (ref 0–0.3)
BILIRUB INDIRECT SERPL-MCNC: NORMAL MG/DL
BILIRUB SERPL-MCNC: 0.3 MG/DL (ref 0–1.2)
CHOLEST SERPL-MCNC: 137 MG/DL (ref 0–200)
DEPRECATED RDW RBC AUTO: 40.9 FL (ref 37–54)
EOSINOPHIL # BLD AUTO: 0.27 10*3/MM3 (ref 0–0.4)
EOSINOPHIL NFR BLD AUTO: 3.8 % (ref 0.3–6.2)
ERYTHROCYTE [DISTWIDTH] IN BLOOD BY AUTOMATED COUNT: 12.8 % (ref 12.3–15.4)
HCG INTACT+B SERPL-ACNC: <1 MIU/ML
HCT VFR BLD AUTO: 43 % (ref 34–46.6)
HDLC SERPL-MCNC: 61 MG/DL (ref 40–60)
HGB BLD-MCNC: 14.6 G/DL (ref 12–15.9)
IMM GRANULOCYTES # BLD AUTO: 0.04 10*3/MM3 (ref 0–0.05)
IMM GRANULOCYTES NFR BLD AUTO: 0.6 % (ref 0–0.5)
LDLC SERPL CALC-MCNC: 63 MG/DL (ref 0–100)
LDLC/HDLC SERPL: 1.05 {RATIO}
LYMPHOCYTES # BLD AUTO: 1.9 10*3/MM3 (ref 0.7–3.1)
LYMPHOCYTES NFR BLD AUTO: 27.1 % (ref 19.6–45.3)
MCH RBC QN AUTO: 30.1 PG (ref 26.6–33)
MCHC RBC AUTO-ENTMCNC: 34 G/DL (ref 31.5–35.7)
MCV RBC AUTO: 88.7 FL (ref 79–97)
MONOCYTES # BLD AUTO: 0.61 10*3/MM3 (ref 0.1–0.9)
MONOCYTES NFR BLD AUTO: 8.7 % (ref 5–12)
NEUTROPHILS NFR BLD AUTO: 4.14 10*3/MM3 (ref 1.7–7)
NEUTROPHILS NFR BLD AUTO: 58.9 % (ref 42.7–76)
NRBC BLD AUTO-RTO: 0 /100 WBC (ref 0–0.2)
PLATELET # BLD AUTO: 299 10*3/MM3 (ref 140–450)
PMV BLD AUTO: 9.6 FL (ref 6–12)
PROT SERPL-MCNC: 6.9 G/DL (ref 6–8.5)
RBC # BLD AUTO: 4.85 10*6/MM3 (ref 3.77–5.28)
TRIGL SERPL-MCNC: 59 MG/DL (ref 0–150)
VLDLC SERPL-MCNC: 13 MG/DL (ref 5–40)
WBC NRBC COR # BLD: 7.02 10*3/MM3 (ref 3.4–10.8)

## 2023-10-03 PROCEDURE — 85025 COMPLETE CBC W/AUTO DIFF WBC: CPT

## 2023-10-03 PROCEDURE — 36415 COLL VENOUS BLD VENIPUNCTURE: CPT

## 2023-10-03 PROCEDURE — 80061 LIPID PANEL: CPT

## 2023-10-03 PROCEDURE — 80076 HEPATIC FUNCTION PANEL: CPT

## 2023-10-03 PROCEDURE — 84702 CHORIONIC GONADOTROPIN TEST: CPT

## 2023-11-27 ENCOUNTER — TRANSCRIBE ORDERS (OUTPATIENT)
Dept: LAB | Facility: HOSPITAL | Age: 23
End: 2023-11-27
Payer: COMMERCIAL

## 2023-11-27 ENCOUNTER — LAB (OUTPATIENT)
Dept: LAB | Facility: HOSPITAL | Age: 23
End: 2023-11-27
Payer: COMMERCIAL

## 2023-11-27 DIAGNOSIS — L57.8 NODULAR ELASTOSIS WITH CYSTS AND COMEDONES OF FAVRE AND RACOUCHOT: Primary | ICD-10-CM

## 2023-11-27 DIAGNOSIS — L70.0 NODULAR ELASTOSIS WITH CYSTS AND COMEDONES OF FAVRE AND RACOUCHOT: ICD-10-CM

## 2023-11-27 DIAGNOSIS — L70.0 NODULAR ELASTOSIS WITH CYSTS AND COMEDONES OF FAVRE AND RACOUCHOT: Primary | ICD-10-CM

## 2023-11-27 DIAGNOSIS — L57.8 NODULAR ELASTOSIS WITH CYSTS AND COMEDONES OF FAVRE AND RACOUCHOT: ICD-10-CM

## 2023-11-27 LAB
ALBUMIN SERPL-MCNC: 4.6 G/DL (ref 3.5–5.2)
ALP SERPL-CCNC: 79 U/L (ref 39–117)
ALT SERPL W P-5'-P-CCNC: 13 U/L (ref 1–33)
AST SERPL-CCNC: 20 U/L (ref 1–32)
BILIRUB CONJ SERPL-MCNC: <0.2 MG/DL (ref 0–0.3)
BILIRUB INDIRECT SERPL-MCNC: NORMAL MG/DL
BILIRUB SERPL-MCNC: 0.4 MG/DL (ref 0–1.2)
CHOLEST SERPL-MCNC: 146 MG/DL (ref 0–200)
HCG INTACT+B SERPL-ACNC: <1 MIU/ML
HDLC SERPL-MCNC: 49 MG/DL (ref 40–60)
LDLC SERPL CALC-MCNC: 78 MG/DL (ref 0–100)
LDLC/HDLC SERPL: 1.56 {RATIO}
PROT SERPL-MCNC: 6.9 G/DL (ref 6–8.5)
TRIGL SERPL-MCNC: 102 MG/DL (ref 0–150)
VLDLC SERPL-MCNC: 19 MG/DL (ref 5–40)

## 2023-11-27 PROCEDURE — 85025 COMPLETE CBC W/AUTO DIFF WBC: CPT

## 2023-11-27 PROCEDURE — 36415 COLL VENOUS BLD VENIPUNCTURE: CPT

## 2023-11-27 PROCEDURE — 80061 LIPID PANEL: CPT

## 2023-11-27 PROCEDURE — 80076 HEPATIC FUNCTION PANEL: CPT

## 2023-11-27 PROCEDURE — 84702 CHORIONIC GONADOTROPIN TEST: CPT

## 2023-11-28 LAB
BASOPHILS # BLD AUTO: 0.06 10*3/MM3 (ref 0–0.2)
BASOPHILS NFR BLD AUTO: 0.8 % (ref 0–1.5)
DEPRECATED RDW RBC AUTO: 39.6 FL (ref 37–54)
EOSINOPHIL # BLD AUTO: 0.2 10*3/MM3 (ref 0–0.4)
EOSINOPHIL NFR BLD AUTO: 2.7 % (ref 0.3–6.2)
ERYTHROCYTE [DISTWIDTH] IN BLOOD BY AUTOMATED COUNT: 12.8 % (ref 12.3–15.4)
HCT VFR BLD AUTO: 40.5 % (ref 34–46.6)
HGB BLD-MCNC: 13.9 G/DL (ref 12–15.9)
IMM GRANULOCYTES # BLD AUTO: 0.02 10*3/MM3 (ref 0–0.05)
IMM GRANULOCYTES NFR BLD AUTO: 0.3 % (ref 0–0.5)
LYMPHOCYTES # BLD AUTO: 2.1 10*3/MM3 (ref 0.7–3.1)
LYMPHOCYTES NFR BLD AUTO: 28.5 % (ref 19.6–45.3)
MCH RBC QN AUTO: 29.8 PG (ref 26.6–33)
MCHC RBC AUTO-ENTMCNC: 34.3 G/DL (ref 31.5–35.7)
MCV RBC AUTO: 86.9 FL (ref 79–97)
MONOCYTES # BLD AUTO: 0.56 10*3/MM3 (ref 0.1–0.9)
MONOCYTES NFR BLD AUTO: 7.6 % (ref 5–12)
NEUTROPHILS NFR BLD AUTO: 4.44 10*3/MM3 (ref 1.7–7)
NEUTROPHILS NFR BLD AUTO: 60.1 % (ref 42.7–76)
NRBC BLD AUTO-RTO: 0 /100 WBC (ref 0–0.2)
PLATELET # BLD AUTO: 280 10*3/MM3 (ref 140–450)
PMV BLD AUTO: 10.3 FL (ref 6–12)
RBC # BLD AUTO: 4.66 10*6/MM3 (ref 3.77–5.28)
WBC NRBC COR # BLD AUTO: 7.38 10*3/MM3 (ref 3.4–10.8)

## 2023-12-26 ENCOUNTER — LAB (OUTPATIENT)
Dept: LAB | Facility: HOSPITAL | Age: 23
End: 2023-12-26
Payer: COMMERCIAL

## 2023-12-26 DIAGNOSIS — L70.0 NODULAR ELASTOSIS WITH CYSTS AND COMEDONES OF FAVRE AND RACOUCHOT: ICD-10-CM

## 2023-12-26 DIAGNOSIS — L57.8 NODULAR ELASTOSIS WITH CYSTS AND COMEDONES OF FAVRE AND RACOUCHOT: ICD-10-CM

## 2023-12-26 LAB
ALBUMIN SERPL-MCNC: 4.7 G/DL (ref 3.5–5.2)
ALP SERPL-CCNC: 72 U/L (ref 39–117)
ALT SERPL W P-5'-P-CCNC: 11 U/L (ref 1–33)
AST SERPL-CCNC: 18 U/L (ref 1–32)
BASOPHILS # BLD AUTO: 0.03 10*3/MM3 (ref 0–0.2)
BASOPHILS NFR BLD AUTO: 0.6 % (ref 0–1.5)
BILIRUB CONJ SERPL-MCNC: <0.2 MG/DL (ref 0–0.3)
BILIRUB INDIRECT SERPL-MCNC: NORMAL MG/DL
BILIRUB SERPL-MCNC: 0.5 MG/DL (ref 0–1.2)
CHOLEST SERPL-MCNC: 148 MG/DL (ref 0–200)
DEPRECATED RDW RBC AUTO: 41.5 FL (ref 37–54)
EOSINOPHIL # BLD AUTO: 0.16 10*3/MM3 (ref 0–0.4)
EOSINOPHIL NFR BLD AUTO: 3.2 % (ref 0.3–6.2)
ERYTHROCYTE [DISTWIDTH] IN BLOOD BY AUTOMATED COUNT: 13.3 % (ref 12.3–15.4)
HCG INTACT+B SERPL-ACNC: <1 MIU/ML
HCT VFR BLD AUTO: 42.4 % (ref 34–46.6)
HDLC SERPL-MCNC: 45 MG/DL (ref 40–60)
HGB BLD-MCNC: 13.8 G/DL (ref 12–15.9)
IMM GRANULOCYTES # BLD AUTO: 0.02 10*3/MM3 (ref 0–0.05)
IMM GRANULOCYTES NFR BLD AUTO: 0.4 % (ref 0–0.5)
LDLC SERPL CALC-MCNC: 87 MG/DL (ref 0–100)
LDLC/HDLC SERPL: 1.92 {RATIO}
LYMPHOCYTES # BLD AUTO: 1.98 10*3/MM3 (ref 0.7–3.1)
LYMPHOCYTES NFR BLD AUTO: 40.1 % (ref 19.6–45.3)
MCH RBC QN AUTO: 28.3 PG (ref 26.6–33)
MCHC RBC AUTO-ENTMCNC: 32.5 G/DL (ref 31.5–35.7)
MCV RBC AUTO: 87.1 FL (ref 79–97)
MONOCYTES # BLD AUTO: 0.39 10*3/MM3 (ref 0.1–0.9)
MONOCYTES NFR BLD AUTO: 7.9 % (ref 5–12)
NEUTROPHILS NFR BLD AUTO: 2.36 10*3/MM3 (ref 1.7–7)
NEUTROPHILS NFR BLD AUTO: 47.8 % (ref 42.7–76)
NRBC BLD AUTO-RTO: 0 /100 WBC (ref 0–0.2)
PLATELET # BLD AUTO: 281 10*3/MM3 (ref 140–450)
PMV BLD AUTO: 9.7 FL (ref 6–12)
PROT SERPL-MCNC: 7.4 G/DL (ref 6–8.5)
RBC # BLD AUTO: 4.87 10*6/MM3 (ref 3.77–5.28)
TRIGL SERPL-MCNC: 82 MG/DL (ref 0–150)
VLDLC SERPL-MCNC: 16 MG/DL (ref 5–40)
WBC NRBC COR # BLD AUTO: 4.94 10*3/MM3 (ref 3.4–10.8)

## 2023-12-26 PROCEDURE — 80061 LIPID PANEL: CPT

## 2023-12-26 PROCEDURE — 85025 COMPLETE CBC W/AUTO DIFF WBC: CPT

## 2023-12-26 PROCEDURE — 36415 COLL VENOUS BLD VENIPUNCTURE: CPT

## 2023-12-26 PROCEDURE — 80076 HEPATIC FUNCTION PANEL: CPT

## 2023-12-26 PROCEDURE — 84702 CHORIONIC GONADOTROPIN TEST: CPT

## 2024-01-26 ENCOUNTER — LAB (OUTPATIENT)
Dept: LAB | Facility: HOSPITAL | Age: 24
End: 2024-01-26
Payer: COMMERCIAL

## 2024-01-26 DIAGNOSIS — L57.8 NODULAR ELASTOSIS WITH CYSTS AND COMEDONES OF FAVRE AND RACOUCHOT: ICD-10-CM

## 2024-01-26 DIAGNOSIS — L70.0 NODULAR ELASTOSIS WITH CYSTS AND COMEDONES OF FAVRE AND RACOUCHOT: ICD-10-CM

## 2024-01-26 LAB
ALBUMIN SERPL-MCNC: 4.6 G/DL (ref 3.5–5.2)
ALP SERPL-CCNC: 70 U/L (ref 39–117)
ALT SERPL W P-5'-P-CCNC: 11 U/L (ref 1–33)
AST SERPL-CCNC: 17 U/L (ref 1–32)
BASOPHILS # BLD AUTO: 0.05 10*3/MM3 (ref 0–0.2)
BASOPHILS NFR BLD AUTO: 0.8 % (ref 0–1.5)
BILIRUB CONJ SERPL-MCNC: <0.2 MG/DL (ref 0–0.3)
BILIRUB INDIRECT SERPL-MCNC: NORMAL MG/DL
BILIRUB SERPL-MCNC: 0.6 MG/DL (ref 0–1.2)
CHOLEST SERPL-MCNC: 139 MG/DL (ref 0–200)
DEPRECATED RDW RBC AUTO: 42.4 FL (ref 37–54)
EOSINOPHIL # BLD AUTO: 0.17 10*3/MM3 (ref 0–0.4)
EOSINOPHIL NFR BLD AUTO: 2.7 % (ref 0.3–6.2)
ERYTHROCYTE [DISTWIDTH] IN BLOOD BY AUTOMATED COUNT: 13.4 % (ref 12.3–15.4)
HCG INTACT+B SERPL-ACNC: <1 MIU/ML
HCT VFR BLD AUTO: 43.4 % (ref 34–46.6)
HDLC SERPL-MCNC: 47 MG/DL (ref 40–60)
HGB BLD-MCNC: 14.7 G/DL (ref 12–15.9)
IMM GRANULOCYTES # BLD AUTO: 0.02 10*3/MM3 (ref 0–0.05)
IMM GRANULOCYTES NFR BLD AUTO: 0.3 % (ref 0–0.5)
LDLC SERPL CALC-MCNC: 78 MG/DL (ref 0–100)
LDLC/HDLC SERPL: 1.67 {RATIO}
LYMPHOCYTES # BLD AUTO: 2.9 10*3/MM3 (ref 0.7–3.1)
LYMPHOCYTES NFR BLD AUTO: 45.8 % (ref 19.6–45.3)
MCH RBC QN AUTO: 29.8 PG (ref 26.6–33)
MCHC RBC AUTO-ENTMCNC: 33.9 G/DL (ref 31.5–35.7)
MCV RBC AUTO: 88 FL (ref 79–97)
MONOCYTES # BLD AUTO: 0.54 10*3/MM3 (ref 0.1–0.9)
MONOCYTES NFR BLD AUTO: 8.5 % (ref 5–12)
NEUTROPHILS NFR BLD AUTO: 2.65 10*3/MM3 (ref 1.7–7)
NEUTROPHILS NFR BLD AUTO: 41.9 % (ref 42.7–76)
NRBC BLD AUTO-RTO: 0 /100 WBC (ref 0–0.2)
PLATELET # BLD AUTO: 309 10*3/MM3 (ref 140–450)
PMV BLD AUTO: 10.2 FL (ref 6–12)
PROT SERPL-MCNC: 7.4 G/DL (ref 6–8.5)
RBC # BLD AUTO: 4.93 10*6/MM3 (ref 3.77–5.28)
TRIGL SERPL-MCNC: 67 MG/DL (ref 0–150)
VLDLC SERPL-MCNC: 14 MG/DL (ref 5–40)
WBC NRBC COR # BLD AUTO: 6.33 10*3/MM3 (ref 3.4–10.8)

## 2024-01-26 PROCEDURE — 84702 CHORIONIC GONADOTROPIN TEST: CPT

## 2024-01-26 PROCEDURE — 80076 HEPATIC FUNCTION PANEL: CPT

## 2024-01-26 PROCEDURE — 36415 COLL VENOUS BLD VENIPUNCTURE: CPT

## 2024-01-26 PROCEDURE — 85025 COMPLETE CBC W/AUTO DIFF WBC: CPT

## 2024-01-26 PROCEDURE — 80061 LIPID PANEL: CPT

## 2024-06-06 ENCOUNTER — OFFICE VISIT (OUTPATIENT)
Dept: INTERNAL MEDICINE | Facility: CLINIC | Age: 24
End: 2024-06-06
Payer: COMMERCIAL

## 2024-06-06 VITALS
BODY MASS INDEX: 24.29 KG/M2 | HEIGHT: 62 IN | WEIGHT: 132 LBS | SYSTOLIC BLOOD PRESSURE: 110 MMHG | HEART RATE: 91 BPM | DIASTOLIC BLOOD PRESSURE: 80 MMHG | OXYGEN SATURATION: 98 %

## 2024-06-06 DIAGNOSIS — J30.1 SEASONAL ALLERGIC RHINITIS DUE TO POLLEN: Primary | ICD-10-CM

## 2024-06-06 DIAGNOSIS — J45.40 MODERATE PERSISTENT ASTHMA WITHOUT COMPLICATION: ICD-10-CM

## 2024-06-06 DIAGNOSIS — R41.840 POOR CONCENTRATION: ICD-10-CM

## 2024-06-06 PROBLEM — M23.8X1 CREPITUS OF JOINT OF RIGHT KNEE: Status: ACTIVE | Noted: 2024-06-06

## 2024-06-06 PROCEDURE — 99214 OFFICE O/P EST MOD 30 MIN: CPT | Performed by: INTERNAL MEDICINE

## 2024-06-06 NOTE — PROGRESS NOTES
Patient is a 23 y.o. female who is here for a follow up of ADD  Chief Complaint   Patient presents with    ADD         HPI:    Here for evaluation of ADD.  Was on Focalin in the past and last was in High School.  Did not require in college, with a 3.8.  Now that she is out she is having hard time meeting dead lines.  Difficulty with motivation.  Still having difficulty with driving.      History:     Patient Active Problem List   Diagnosis    Dyspepsia    Moderate persistent asthma without complication    Seasonal allergic rhinitis due to pollen    Screening for lipid disorders    Other chronic sinusitis    Gastroesophageal reflux disease without esophagitis    Abnormal ECG    Crepitus of joint of right knee    Poor concentration       Past Medical History:   Diagnosis Date    Abnormal EKG     Asthma     Depression     GERD (gastroesophageal reflux disease)        Past Surgical History:   Procedure Laterality Date    APPENDECTOMY  2012    ENDOSCOPY         Current Outpatient Medications on File Prior to Visit   Medication Sig    levonorgestrel (KYLEENA) 19.5 MG intrauterine device IUD To be inserted one time by prescriber. Route intrauterine.    tretinoin (RETIN-A) 0.01 % gel Daily.    [DISCONTINUED] albuterol sulfate  (90 Base) MCG/ACT inhaler Inhale 2 puffs Every 4 (Four) Hours As Needed for Wheezing. (Patient not taking: Reported on 6/6/2024)    [DISCONTINUED] azelaic acid (AZELEX) 15 % gel 1 application Every 12 (Twelve) Hours. (Patient not taking: Reported on 6/6/2024)    [DISCONTINUED] azelastine (ASTELIN) 0.1 % nasal spray Every 12 (Twelve) Hours. (Patient not taking: Reported on 6/6/2024)    [DISCONTINUED] cetirizine (zyrTEC) 10 MG tablet Daily. (Patient not taking: Reported on 6/6/2024)    [DISCONTINUED] lamoTRIgine (LaMICtal) 25 MG tablet TAKE 1 TABLET BY MOUTH EVERY DAY FOR 14 DAYS, THEN TAKE 2 TABLETS EVERY DAY THEREAFTER (Patient not taking: Reported on 6/6/2024)    [DISCONTINUED] methocarbamol  (ROBAXIN) 500 MG tablet Take 1 tablet by mouth 3 (Three) Times a Day As Needed for Muscle Spasms. (Patient not taking: Reported on 6/6/2024)    [DISCONTINUED] naproxen (EC NAPROSYN) 500 MG EC tablet Daily. (Patient not taking: Reported on 6/6/2024)    [DISCONTINUED] predniSONE (DELTASONE) 10 MG tablet DAILY TAPER - 6/6/5/5/4/4/3/3/2/2/1/1 THEN D/C (Patient not taking: Reported on 6/6/2024)     No current facility-administered medications on file prior to visit.       Family History   Problem Relation Age of Onset    Mental illness Mother     Migraines Mother     Supraventricular tachycardia Mother     Mental illness Father     Migraines Sister     Asthma Sister     Breast cancer Paternal Grandmother     Colon cancer Neg Hx     Colon polyps Neg Hx        Social History     Socioeconomic History    Marital status: Single   Tobacco Use    Smoking status: Never     Passive exposure: Never    Smokeless tobacco: Never   Vaping Use    Vaping status: Never Used    Passive vaping exposure: Yes   Substance and Sexual Activity    Alcohol use: Yes     Comment: Socially    Drug use: Not Currently     Types: Marijuana    Sexual activity: Yes     Partners: Male     Birth control/protection: I.U.D.         Review of Systems   Constitutional:  Negative for chills, fever and unexpected weight change.   HENT:  Negative for ear pain, hearing loss, rhinorrhea, sore throat and trouble swallowing.    Eyes:  Negative for discharge and itching.   Respiratory:  Negative for cough and chest tightness.    Cardiovascular:  Negative for chest pain, palpitations and leg swelling.   Gastrointestinal:  Negative for blood in stool, constipation, diarrhea and vomiting.        See HPI   Endocrine: Negative for polydipsia and polyuria.   Genitourinary:  Negative for difficulty urinating, dysuria, enuresis, frequency, hematuria and urgency.        Hx of frequent UTI   Musculoskeletal:  Positive for arthralgias. Negative for back pain, gait problem and  "joint swelling.   Skin:  Negative for rash and wound.   Allergic/Immunologic: Positive for environmental allergies (seasonal). Negative for immunocompromised state.   Neurological:  Negative for dizziness, syncope, weakness, light-headedness, numbness and headaches.   Hematological:  Does not bruise/bleed easily.   Psychiatric/Behavioral:  Positive for decreased concentration and sleep disturbance. Negative for behavioral problems and dysphoric mood. The patient is nervous/anxious.        /80   Pulse 91   Ht 157.5 cm (62.01\")   Wt 59.9 kg (132 lb)   SpO2 98%   BMI 24.14 kg/m²       Physical Exam  Constitutional:       Appearance: Normal appearance. She is well-developed.   HENT:      Head: Normocephalic and atraumatic.      Right Ear: External ear normal.      Left Ear: External ear normal.      Nose: Nose normal.      Mouth/Throat:      Mouth: Mucous membranes are moist.      Pharynx: Oropharynx is clear.   Eyes:      Extraocular Movements: Extraocular movements intact.      Conjunctiva/sclera: Conjunctivae normal.      Pupils: Pupils are equal, round, and reactive to light.   Cardiovascular:      Rate and Rhythm: Normal rate and regular rhythm.      Heart sounds: Normal heart sounds.   Pulmonary:      Effort: Pulmonary effort is normal.      Breath sounds: Normal breath sounds.   Abdominal:      General: Bowel sounds are normal.      Palpations: Abdomen is soft.   Musculoskeletal:         General: Normal range of motion.      Cervical back: Normal range of motion and neck supple.      Comments: Right knee crepitus   Lymphadenopathy:      Cervical: No cervical adenopathy.   Skin:     General: Skin is warm and dry.   Neurological:      General: No focal deficit present.      Mental Status: She is alert and oriented to person, place, and time.   Psychiatric:         Mood and Affect: Mood normal.         Behavior: Behavior normal.         Thought Content: Thought content normal. "         Procedure:      Discussion/Summary:    ILDEFONSO-f/u with BH  Asthma/rhinitis-f/u with allergist  Right knee crepitus-glucosamine  ?ADD-labs soon, will refer to Behavioral Health       Current Outpatient Medications:     levonorgestrel (KYLEENA) 19.5 MG intrauterine device IUD, To be inserted one time by prescriber. Route intrauterine., Disp: , Rfl:     tretinoin (RETIN-A) 0.01 % gel, Daily., Disp: , Rfl:         Diagnoses and all orders for this visit:    1. Seasonal allergic rhinitis due to pollen (Primary)    2. Moderate persistent asthma without complication    3. Poor concentration  -     TSH; Future  -     Ambulatory Referral to Behavioral Health

## 2024-06-14 ENCOUNTER — LAB (OUTPATIENT)
Dept: LAB | Facility: HOSPITAL | Age: 24
End: 2024-06-14
Payer: COMMERCIAL

## 2024-06-14 DIAGNOSIS — R41.840 POOR CONCENTRATION: ICD-10-CM

## 2024-06-14 PROCEDURE — 84443 ASSAY THYROID STIM HORMONE: CPT

## 2024-06-14 PROCEDURE — 36415 COLL VENOUS BLD VENIPUNCTURE: CPT

## 2024-06-15 LAB — TSH SERPL DL<=0.05 MIU/L-ACNC: 2.92 UIU/ML (ref 0.27–4.2)

## 2024-07-03 ENCOUNTER — OFFICE VISIT (OUTPATIENT)
Age: 24
End: 2024-07-03
Payer: COMMERCIAL

## 2024-07-03 VITALS
SYSTOLIC BLOOD PRESSURE: 108 MMHG | WEIGHT: 132.8 LBS | BODY MASS INDEX: 24.44 KG/M2 | OXYGEN SATURATION: 96 % | DIASTOLIC BLOOD PRESSURE: 68 MMHG | HEIGHT: 62 IN | HEART RATE: 81 BPM

## 2024-07-03 DIAGNOSIS — F33.1 MODERATE EPISODE OF RECURRENT MAJOR DEPRESSIVE DISORDER: Primary | ICD-10-CM

## 2024-07-03 DIAGNOSIS — Z13.39 ADHD (ATTENTION DEFICIT HYPERACTIVITY DISORDER) EVALUATION: ICD-10-CM

## 2024-07-03 DIAGNOSIS — F99 INSOMNIA DUE TO OTHER MENTAL DISORDER: ICD-10-CM

## 2024-07-03 DIAGNOSIS — F39 UNSPECIFIED MOOD (AFFECTIVE) DISORDER: ICD-10-CM

## 2024-07-03 DIAGNOSIS — F51.05 INSOMNIA DUE TO OTHER MENTAL DISORDER: ICD-10-CM

## 2024-07-03 DIAGNOSIS — Z51.81 ENCOUNTER FOR THERAPEUTIC DRUG MONITORING: ICD-10-CM

## 2024-07-03 RX ORDER — CLONIDINE HYDROCHLORIDE 0.1 MG/1
0.1 TABLET ORAL NIGHTLY PRN
Qty: 30 TABLET | Refills: 0 | Status: SHIPPED | OUTPATIENT
Start: 2024-07-03

## 2024-07-03 RX ORDER — ARIPIPRAZOLE 2 MG/1
2 TABLET ORAL DAILY
Qty: 30 TABLET | Refills: 1 | Status: SHIPPED | OUTPATIENT
Start: 2024-07-03

## 2024-07-03 NOTE — PROGRESS NOTES
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 medications. Also advised to refrain from activity requiring  alertness until sedative effects of medication are assessed.     Prognosis: Fair with Ongoing Treatment   Unique factors influencing symptom alleviation/remission include: pre-existing conditions, symptom chronicity, symptom severity, degree of impairment, social support, financial security, motivation, patient engagement and medication adherence. Prognosis is largely dependent  on patient's adherence to medication treatment plan, follow up appointments and willingness to engage in psychotherapy    Functional Status: Mild impairment      Short-term goals: Patient will adhere to medication regimen and experience continued improvement in symptoms over the next 3 months.   Long-term goals: Patient will adhere to medication treatment plan and report improvement in symptoms over the next 6 months      Quality Measures:     TOBACCO USE:  Tobacco Use: High Risk (7/3/2024)    Patient History     Smoking Tobacco Use: Some Days     Smokeless Tobacco Use: Never     Passive Exposure: Never      Current some day smoker less than 3 minutes spent counseling Not agreeable to stopping    I jj Brooks of the risks of tobacco use.     Follow Up:   Return in about 4 weeks (around 7/31/2024).    Lilia Guerrero  PMHNP-BC

## 2024-07-23 PROBLEM — R05.9 COUGH: Status: ACTIVE | Noted: 2024-07-23

## 2024-07-23 PROBLEM — J02.9 SORE THROAT: Status: ACTIVE | Noted: 2024-07-23

## 2024-07-24 ENCOUNTER — PATIENT ROUNDING (BHMG ONLY) (OUTPATIENT)
Dept: URGENT CARE | Facility: CLINIC | Age: 24
End: 2024-07-24
Payer: COMMERCIAL

## 2024-08-16 ENCOUNTER — OFFICE VISIT (OUTPATIENT)
Age: 24
End: 2024-08-16
Payer: COMMERCIAL

## 2024-08-16 VITALS
HEIGHT: 62 IN | DIASTOLIC BLOOD PRESSURE: 88 MMHG | HEART RATE: 85 BPM | BODY MASS INDEX: 25.27 KG/M2 | OXYGEN SATURATION: 97 % | WEIGHT: 137.3 LBS | SYSTOLIC BLOOD PRESSURE: 128 MMHG

## 2024-08-16 DIAGNOSIS — F90.0 ADHD, PREDOMINANTLY INATTENTIVE TYPE: Primary | ICD-10-CM

## 2024-08-16 DIAGNOSIS — F33.1 MODERATE EPISODE OF RECURRENT MAJOR DEPRESSIVE DISORDER: ICD-10-CM

## 2024-08-16 DIAGNOSIS — F51.05 INSOMNIA DUE TO OTHER MENTAL DISORDER: ICD-10-CM

## 2024-08-16 DIAGNOSIS — F99 INSOMNIA DUE TO OTHER MENTAL DISORDER: ICD-10-CM

## 2024-08-16 DIAGNOSIS — Z51.81 ENCOUNTER FOR THERAPEUTIC DRUG MONITORING: ICD-10-CM

## 2024-08-16 RX ORDER — LISDEXAMFETAMINE DIMESYLATE 20 MG/1
20 CAPSULE ORAL EVERY MORNING
Qty: 30 CAPSULE | Refills: 0 | Status: SHIPPED | OUTPATIENT
Start: 2024-08-16

## 2024-08-16 RX ORDER — CLONIDINE HYDROCHLORIDE 0.1 MG/1
0.1 TABLET ORAL 2 TIMES DAILY PRN
Qty: 30 TABLET | Refills: 1 | Status: SHIPPED | OUTPATIENT
Start: 2024-08-16
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
## 2024-08-22 LAB
1OH-MIDAZOLAM UR QL SCN: NOT DETECTED NG/MG CREAT
6MAM UR QL SCN: NEGATIVE NG/ML
7AMINOCLONAZEPAM/CREAT UR: NOT DETECTED NG/MG CREAT
A-OH ALPRAZ/CREAT UR: NOT DETECTED NG/MG CREAT
A-OH-TRIAZOLAM/CREAT UR CFM: NOT DETECTED NG/MG CREAT
ACP UR QL CFM: NOT DETECTED
ALPRAZ/CREAT UR CFM: NOT DETECTED NG/MG CREAT
AMPHETAMINES UR QL SCN: NEGATIVE NG/ML
APAP UR QL SCN: NORMAL UG/ML
APAP UR QL: NORMAL
APAP UR-MCNC: PRESENT UG/ML
BARBITURATES UR QL SCN: NEGATIVE NG/ML
BENZODIAZ SCN METH UR: NEGATIVE
BUPRENORPHINE UR QL SCN: NEGATIVE
BUPRENORPHINE/CREAT UR: NOT DETECTED NG/MG CREAT
CANNABINOIDS UR QL SCN: NEGATIVE NG/ML
CARISOPRODOL UR QL: NEGATIVE NG/ML
CLONAZEPAM/CREAT UR CFM: NOT DETECTED NG/MG CREAT
COCAINE+BZE UR QL SCN: NEGATIVE NG/ML
CREAT UR-MCNC: 131 MG/DL
D-METHORPHAN UR-MCNC: NOT DETECTED NG/ML
D-METHORPHAN+LEVORPHANOL UR QL: NOT DETECTED
DESALKYLFLURAZ/CREAT UR: NOT DETECTED NG/MG CREAT
DIAZEPAM/CREAT UR: NOT DETECTED NG/MG CREAT
ETHANOL UR QL SCN: NEGATIVE G/DL
ETHANOL UR QL SCN: NEGATIVE NG/ML
FENTANYL CTO UR SCN-MCNC: NEGATIVE NG/ML
FENTANYL/CREAT UR: NOT DETECTED NG/MG CREAT
FLUNITRAZEPAM UR QL SCN: NOT DETECTED NG/MG CREAT
GABAPENTIN UR-MCNC: NEGATIVE UG/ML
HALLUCINOGENS UR: NEGATIVE
HYPNOTICS UR QL SCN: NEGATIVE
KETAMINE UR QL: NOT DETECTED
LORAZEPAM/CREAT UR: NOT DETECTED NG/MG CREAT
MEPERIDINE UR QL SCN: NEGATIVE NG/ML
METHADONE UR QL SCN: NEGATIVE NG/ML
METHADONE+METAB UR QL SCN: NEGATIVE NG/ML
MIDAZOLAM/CREAT UR CFM: NOT DETECTED NG/MG CREAT
MISCELLANEOUS, UR: NEGATIVE
NORBUPRENORPHINE/CREAT UR: NOT DETECTED NG/MG CREAT
NORDIAZEPAM/CREAT UR: NOT DETECTED NG/MG CREAT
NORFENTANYL/CREAT UR: NOT DETECTED NG/MG CREAT
NORFLUNITRAZEPAM UR-MCNC: NOT DETECTED NG/MG CREAT
NORKETAMINE UR-MCNC: NOT DETECTED UG/ML
OPIATES UR SCN-MCNC: NEGATIVE NG/ML
OXAZEPAM/CREAT UR: NOT DETECTED NG/MG CREAT
OXYCODONE CTO UR SCN-MCNC: NEGATIVE NG/ML
PCP UR QL SCN: NEGATIVE NG/ML
PRESCRIBED MEDICATIONS: NORMAL
PROPOXYPH UR QL SCN: NEGATIVE NG/ML
TAPENTADOL CTO UR SCN-MCNC: NEGATIVE NG/ML
TEMAZEPAM/CREAT UR: NOT DETECTED NG/MG CREAT
TRAMADOL UR QL SCN: NEGATIVE NG/ML
ZALEPLON UR-MCNC: NOT DETECTED NG/ML
ZOLPIDEM PHENYL-4-CARB UR QL SCN: NOT DETECTED
ZOLPIDEM UR QL SCN: NOT DETECTED
ZOPICLONE-N-OXIDE UR-MCNC: NOT DETECTED NG/ML

## 2024-09-16 ENCOUNTER — TELEMEDICINE (OUTPATIENT)
Age: 24
End: 2024-09-16
Payer: COMMERCIAL

## 2024-09-16 VITALS — WEIGHT: 137 LBS | BODY MASS INDEX: 25.05 KG/M2

## 2024-09-16 DIAGNOSIS — F99 INSOMNIA DUE TO OTHER MENTAL DISORDER: ICD-10-CM

## 2024-09-16 DIAGNOSIS — F90.0 ADHD, PREDOMINANTLY INATTENTIVE TYPE: Primary | ICD-10-CM

## 2024-09-16 DIAGNOSIS — F51.05 INSOMNIA DUE TO OTHER MENTAL DISORDER: ICD-10-CM

## 2024-09-16 DIAGNOSIS — F33.1 MODERATE EPISODE OF RECURRENT MAJOR DEPRESSIVE DISORDER: ICD-10-CM

## 2024-09-16 PROCEDURE — 99214 OFFICE O/P EST MOD 30 MIN: CPT | Performed by: NURSE PRACTITIONER

## 2024-09-16 PROCEDURE — 96127 BRIEF EMOTIONAL/BEHAV ASSMT: CPT | Performed by: NURSE PRACTITIONER

## 2024-09-16 RX ORDER — TRAZODONE HYDROCHLORIDE 50 MG/1
25-50 TABLET, FILM COATED ORAL NIGHTLY
Qty: 30 TABLET | Refills: 0 | Status: SHIPPED | OUTPATIENT
Start: 2024-09-16

## 2024-09-16 RX ORDER — LISDEXAMFETAMINE DIMESYLATE 30 MG/1
30 CAPSULE ORAL EVERY MORNING
Qty: 30 CAPSULE | Refills: 0 | Status: SHIPPED | OUTPATIENT
Start: 2024-09-16

## 2024-09-16 RX ORDER — CETIRIZINE HYDROCHLORIDE 10 MG/1
10 TABLET ORAL DAILY
COMMUNITY

## 2024-09-16 RX ORDER — DOXYCYCLINE 50 MG/1
50 CAPSULE ORAL 2 TIMES DAILY
COMMUNITY
End: 2024-10-14

## 2024-09-18 ENCOUNTER — TELEPHONE (OUTPATIENT)
Age: 24
End: 2024-09-18
Payer: COMMERCIAL

## 2024-10-14 DIAGNOSIS — F51.05 INSOMNIA DUE TO OTHER MENTAL DISORDER: ICD-10-CM

## 2024-10-14 DIAGNOSIS — F99 INSOMNIA DUE TO OTHER MENTAL DISORDER: ICD-10-CM

## 2024-10-14 DIAGNOSIS — F33.1 MODERATE EPISODE OF RECURRENT MAJOR DEPRESSIVE DISORDER: ICD-10-CM

## 2024-10-14 RX ORDER — TRAZODONE HYDROCHLORIDE 50 MG/1
TABLET, FILM COATED ORAL
Qty: 30 TABLET | Refills: 0 | OUTPATIENT
Start: 2024-10-14

## 2024-10-15 ENCOUNTER — TELEMEDICINE (OUTPATIENT)
Age: 24
End: 2024-10-15
Payer: COMMERCIAL

## 2024-10-15 VITALS — BODY MASS INDEX: 25.05 KG/M2 | WEIGHT: 137 LBS

## 2024-10-15 DIAGNOSIS — F99 INSOMNIA DUE TO OTHER MENTAL DISORDER: ICD-10-CM

## 2024-10-15 DIAGNOSIS — F33.1 MODERATE EPISODE OF RECURRENT MAJOR DEPRESSIVE DISORDER: Primary | ICD-10-CM

## 2024-10-15 DIAGNOSIS — F90.0 ADHD, PREDOMINANTLY INATTENTIVE TYPE: ICD-10-CM

## 2024-10-15 DIAGNOSIS — F51.05 INSOMNIA DUE TO OTHER MENTAL DISORDER: ICD-10-CM

## 2024-10-15 RX ORDER — LISDEXAMFETAMINE DIMESYLATE 40 MG/1
40 CAPSULE ORAL EVERY MORNING
Qty: 30 CAPSULE | Refills: 0 | Status: SHIPPED | OUTPATIENT
Start: 2024-10-15

## 2024-10-15 RX ORDER — TRAZODONE HYDROCHLORIDE 50 MG/1
25-50 TABLET, FILM COATED ORAL NIGHTLY
Qty: 30 TABLET | Refills: 0 | Status: SHIPPED | OUTPATIENT
Start: 2024-10-15

## 2024-10-15 NOTE — PROGRESS NOTES
Telehealth  Follow Up Visit      This provider is located at  [facility], located at [address], [address] and is conducting  a secure MyChart Video Visit through [employer]. Patient is being evaluated today  at their home address in [place], and states they are  in a secure environment for this appointment. The patient consents to be seen remotely, and when consent is given they understand that the consent allows for patient identifiable information to be sent to a third party as needed. They may refuse to be seen remotely at any time. The electronic data is encrypted and password protected, and the patient  has been advised of the potential risks to privacy not withstanding such measures. The patient's condition being diagnosed/treated is appropriate for telemedicine. The provider identified herself as well as her credentials to patient. Elida      Patient Name: Cecilia Granger  : 2000   MRN: [id number]     Referring Provider: Chapincito Armenta MD    Chief Complaint:       ICD-10-CM ICD-9-CM   1. Moderate episode of recurrent major depressive disorder  F33.1 296.32   2. Insomnia due to other mental disorder  F51.05 300.9    F99 327.02   3. ADHD, predominantly inattentive type  F90.0 314.00     History of Present Illness     Cecilia Granger is a 24 y.o. female who is here today for follow up related to  ADHD evaluation, insomnia, depression, anxiety.  Patient last seen 2024 at which time Vyvanse increased to  30 mg and Trazodone was started.    Pt reports Vyvanse is doing great but wears off a little early. She takes it at 830 am and notices it wears off flory in the afternoon. She  would like to discuss increasing the  dose. She feels Vyvanse enables her to be efficient at  works and complete her home chores. Reports her Binge eating is improving but she still has cravings. Reports her  anxiety is increasing due to her ex boyfriend  "getting out of [employer] early and showing  up at friends work and causing  some problems there. Otherwise,  she is doing well. Describes her mood as \"Happy.\"  PHQ score is 8 noted for feeling depressed, fatigue, overeating, poor self-esteem, trouble concentrating. She denies SI/HI/AVH. Her ILDEFONSO score is 7.  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     Pt reports Trazodone is saving her and she is finally sleeping. Usually only takes 1/4 to 1/2 tab and she is asleep in 15 minutes. Reports binge eating has  improved somewhat. Thinks she has lost some wt due to clothes fitting more loosely. She notices her armpits sweat more than usual and reports she drinks more water. She feels overall, her medications provide substantial benefit.  She denies any health changes since last visit. She has a Kyleena IUD.    Diagnosis:ADHD, depression, anxiety  Medications: none  Therapy: none sustained      Subjective      Review of Systems:   Review of Systems   Constitutional:  Positive for activity change, appetite change and fatigue.   Psychiatric/Behavioral:  Positive for decreased concentration, dysphoric mood and sleep disturbance. The patient is nervous/anxious.    All other systems reviewed and are negative.      PHQ-9 Depression Screening  Little interest or pleasure in doing things?  0   Feeling down, depressed, or hopeless?  1   Trouble falling or staying asleep, or sleeping too much?  1   Feeling tired or having little energy?  2   Poor appetite or overeating?  2   Feeling bad about yourself - or that you are a failure or have let yourself or your family down?  1   Trouble concentrating on things, such as reading the newspaper or watching television?  1   Moving or speaking so slowly that other people could have noticed? Or the opposite - being so fidgety or restless that you have been " moving around a lot more than usual?  0   Thoughts that you would be better off dead, or of hurting yourself in some way?  0   PHQ-9 Total Score  8   If you checked off any problems, how difficult have these problems made it for you to do your work, take care of things at home, or get along with other people? Somewhat difficult       ILDEFONSO-7      Over the last two weeks, how often have you been bothered by the following problems?  Feeling nervous, anxious or on edge: (Patient-Rptd) Several days  Not being able to stop or control worrying: (Patient-Rptd) Several days  Worrying too much about different things: (Patient-Rptd) Several days  Trouble Relaxing: (Patient-Rptd) Several days  Being so restless that it is hard to sit still: (Patient-Rptd) Several days  Becoming easily annoyed or irritable: (Patient-Rptd) Several days  Feeling afraid as if something awful might happen: (Patient-Rptd) Several days  ILDEFONSO 7 Total Score: (Patient-Rptd) 7  If you checked any problems, how difficult have these problems made it for you to do your work, take care of things at home, or get along with other people: (Patient-Rptd) Somewhat difficult    Patient History:   The following portions of the patient's history were reviewed and updated as appropriate: allergies, current medications, past family history, past medical history, past social history, past surgical history and problem list.     Social History     Socioeconomic History    Marital status: Single   Tobacco Use    Smoking status: Some Days     Types: Cigarettes     Passive exposure: Never    Smokeless tobacco: Never   Vaping Use    Vaping status: Every Day    Substances: Nicotine    Devices: Disposable    Passive vaping exposure: Yes   Substance and Sexual Activity    Alcohol use: Yes     Comment: Socially    Drug use: Not Currently     Types: Marijuana    Sexual activity: Yes     Partners: Male     Birth control/protection: I.U.D.     Past Psychiatric History:   History of  outpatient psychiatrist: [facility]  Diagnoses: ADHD anxiety, depression  History of outpatient therapy: x 2 in 2021  Previous Inpatient hospitalizations: none  Previous medication trials:   Focalin,     Adderal IR     Straterra   Concerta-awful overly energized,   Vyvanse.   Focalin    Ritalin.  Wellbutrin helpful   Lamictal,   multiple SSRIs and Prozac caused SI,   Cymbalta-ineffective.   Effexor-ineffecive,   History of suicide/self harm attempts: none    Medications:     Current Outpatient Medications:     cetirizine (zyrTEC) 10 MG tablet, Take 1 tablet by mouth Daily., Disp: , Rfl:     levonorgestrel (KYLEENA) 19.5 MG intrauterine device IUD, To be inserted one time by prescriber. Route intrauterine., Disp: , Rfl:     lisdexamfetamine (Vyvanse) 40 MG capsule, Take 1 capsule by mouth Every Morning, Disp: 30 capsule, Rfl: 0    traZODone (DESYREL) 50 MG tablet, Take 0.5-1 tablets by mouth Every Night., Disp: 30 tablet, Rfl: 0    Objective     Physical Exam    Vital Signs:   JAZIEL virtual visit    Body mass index is 25.05 kg/m².       Mental Status Exam:   MENTAL STATUS EXAM   General Appearance:  Cleanly groomed and dressed and other  Eye Contact:  Good eye contact  Attitude:  Cooperative  Motor Activity:  Other  Other Comment:  Sitting during virtual visit  Muscle Strength:  Normal  Speech:  Normal rate, tone, volume  Language:  Spontaneous  Mood and affect:  Normal, pleasant  Hopelessness:  Denies  Loneliness: Denies  Thought Process:  Logical and goal-directed  Associations/ Thought Content:  No delusions  Hallucinations:  None  Suicidal Ideations:  Not present  Homicidal Ideation:  Not present  Sensorium:  Alert and clear  Orientation:  Person, place, time and situation  Immediate Recall, Recent, and Remote Memory:  Intact  Attention Span/ Concentration:  Good  Fund of Knowledge:  Appropriate for age and educational level  Intellectual Functioning:  Average range  Insight:  Good  Judgement:  Good  Reliability:   Good  Impulse Control:  Good       @RESULASTCBCDIFFPANEL,TSH,LABLIPI,HOFBXKYH07,OUFWYHBZ80,MG,FOLATE,PROLACTIN,CRPRESULT,CMP,V4TVKKMBEYT)@    Lab Results   Component Value Date    GLUCOSE 88 06/04/2021    BUN 9 06/04/2021    CREATININE 0.84 06/04/2021    EGFR Unable to Calculate 05/31/2016    BCR 10.7 06/04/2021    K 4.4 06/04/2021    CO2 29.0 06/04/2021    CALCIUM 9.9 06/04/2021    ALBUMIN 4.6 01/26/2024    BILITOT 0.6 01/26/2024    AST 17 01/26/2024    ALT 11 01/26/2024       Lab Results   Component Value Date    WBC 6.33 01/26/2024    HGB 14.7 01/26/2024    HCT 43.4 01/26/2024    MCV 88.0 01/26/2024     01/26/2024       Lab Results   Component Value Date    CHOL 139 01/26/2024    CHLPL 135 05/31/2016    TRIG 67 01/26/2024    HDL 47 01/26/2024    LDL 78 01/26/2024       Latest Reference Range & Units 06/14/24 15:05   TSH Baseline 0.270 - 4.200 uIU/mL 2.920        Latest Reference Range & Units 06/22/20 10:45   Vitamin B-12 211 - 946 pg/mL 225       Latest Reference Range & Units 06/14/24 15:05   TSH Baseline 0.270 - 4.200 uIU/mL 2.920     Results  Testing  EKG today is within normal limits.         Dispensed  Strength Quantity Days Supply Provider Pharmacy   08/19/2024 Lisdexamfetamine Dimesyla 20MG 30 each 30 [facility]        Assessment / Plan      Visit Diagnosis/Orders Placed This Visit:  Diagnoses and all orders for this visit:    1. Moderate episode of recurrent major depressive disorder (Primary)  -     traZODone (DESYREL) 50 MG tablet; Take 0.5-1 tablets by mouth Every Night.  Dispense: 30 tablet; Refill: 0    2. Insomnia due to other mental disorder  -     traZODone (DESYREL) 50 MG tablet; Take 0.5-1 tablets by mouth Every Night.  Dispense: 30 tablet; Refill: 0    3. ADHD, predominantly inattentive type  -     lisdexamfetamine (Vyvanse) 40 MG capsule; Take 1 capsule by mouth Every Morning  Dispense: 30 capsule; Refill: 0      Assessment & Plan  1. Attention Deficit Hyperactivity  "Disorder (ADHD).  The patient reports positive changes in focus and motivation since starting Vyvanse 30 mg, but experiences a \"flat\" feeling when the medication wears off around noon to 2 PM. The dosage of Vyvanse will be increased to 40 mg to help extend its effectiveness. She takes Vyvanse with food to avoid jitteriness.         2. Insomnia.  The patient has issues falling asleep and has tried melatonin and magnesium glycinate without success. Trazodone 25 to 50 mg will be continued to be taken as needed for sleep. The use of the CBT-I  siobhan for cognitive behavioral therapy for insomnia was recommended.      Plan:    -Kyleena IUD  -Increase  Vyvanse 40 mg daily; advised to monitor for hypomania  -Continue trazodone 25-50 mg at bedtime as needed for insomnia  - CPT - 7 atypical scores, strong indication of inattention and some vigilance.   -CSA signed, summary given  -Pt to bring genesight testing       Treatment and medication options discussed during today's visit. Patient ackowledged and verbally consented to continue with current treatment plan and was educated on the importance of compliance with treatment and follow-up appointments. Patient seems reasonably able to adhere to treatment plan.      Update: Patient reports she never started clonidine or Abilify because she forgot about them being at the pharmacy.  Further states her mother discouraged her from taking Abilify.  Patient feels overall mood has improved.  He still feels quite anxious and identifies focus impairment as her priority for treatment.  Provider discussed at length the risk of stimulants possibly regarding calderon if underlying bipolarity is present.  Patient has trialed multiple SSRIs with adverse effects and is hesitant to try SGA due to mom's influence.  Start low-dose Vyvanse and monitor for symptoms of hypomania or calderon. Patient is agreed to notify clinic should she experience any adverse effects or activation with " stimulant    Impression/Formulation: Patient appears alert and oriented.  Patient reports been diagnosed with ADHD in middle school and trailed many stimulants she took until around 11th grade and Focalin worked best.  Reports she is feeling more just disorganized and falling behind at work since her workload has doubled.  Patient also reports noticeable mood switches that occur a few times per month since age 20.  She experiences grandiosity, increased need to socialize, goal directed activity and hypersexuality for 3-4 days and then she is more depressed and isolates and struggles to function.  Patient denies any decrease need for sleep, risky behavior, frivolous spending or increasing physical energy during this time.  She reports chronic insomnia worsening over last few years due to racing thoughts and feelings of guilt.      Pt understands that mood stabilization is priority and then other factors such as ADHD can be managed with mood is supported. Pt reports she has taken multiple SSRIs and able to recall many of them but recalls having SI with Prozac.  She also took Effexor and Cymbalta which was ineffective.  Patient's father is diagnosed with bipolar disorder which increases  patient's heritability tenfold.  Given this factor in her adverse response to SSRIs and SNRIs Abilify will be initiated. Lengthy discussion with patient regarding the potential side effects of antipsychotic medications including: increased cholesterol, increased blood sugar, and possibility of weight gain.  Also discussed the need to routinely monitor labs with the initiation of these medications for the purpose of identifying possible metabolic disturbances, and adjusting medication regimen. The risk of muscle movement disorders with this class of medication was also discussed and patient advised to notify provider should they occur.  She is agreeable to starting clonidine 0.1 mg at night as needed for insomnia if sleep does not  improve.  We will obtain UDS and CPT at follow-up     Stimulant:   Medication options for treatment of ADHD discussed including stimulant and non-stimulant options. Extensive education is provided regarding risks associated with stimulant use including but not limited to:, insomnia, headache, exacerbation of tics, nervousness, irritability, overstimulation, tremor, dizziness, anorexia or change in appetite, nausea, dry mouth, constipation, diarrhea, weight loss, sexual dysfunction, psychotic episodes, seizures, palpitations, tachycardia, hypertension, rare activation (activation of hypomania, calderon, and/or suicidal ideations), cardiovascular adverse effects including sudden death. Patient denies any personal or family history of seizures or structural cardiac abnormalities.     Controlled substance agreement reviewed and signed by patient, Patient  is  informed that the medication is to be used by the patient only, the medication is to be used only as directed, and the medication should not be combined with other substances unless directed by a Provider/Prescriber. I advised patients to avoid taking  medication with alcohol or illicit/unprescribed substances., including THC. Patient understands that habitual use of THC while being prescribed a stimulant will result in provider discontinuing stimulant medication due to the cognition dulling effects of marijuana negating the cognitive enhancing action of the stimulant. The patient verbalizes understanding and agreement with this in their own words, and wishes to pursue proposed treatment plan.     Medication Considerations:  Discussed medication options and treatment plan of prescribed medication(s) as well as the risks, benefits, and potential side effects. Patient is agreeable to call the office with any worsening of symptoms or onset of side effects. Patient is agreeable to call 911 or go to the nearest ER should he/she begin having SI/HI.    Quality Measures:    Never smoker    I advised Cecilia Granger of the risks of tobacco use.     Follow Up:   Return in about 2 months (around 12/15/2024).      MELANIA Greene, [facility]-BC    Patient or patient representative verbalized consent for the use of Ambient Listening during the visit with  MELANIA Grullon for chart documentation. 10/15/2024  13:32 EDT

## 2024-11-17 DIAGNOSIS — F99 INSOMNIA DUE TO OTHER MENTAL DISORDER: ICD-10-CM

## 2024-11-17 DIAGNOSIS — F33.1 MODERATE EPISODE OF RECURRENT MAJOR DEPRESSIVE DISORDER: ICD-10-CM

## 2024-11-17 DIAGNOSIS — F51.05 INSOMNIA DUE TO OTHER MENTAL DISORDER: ICD-10-CM

## 2024-11-18 RX ORDER — TRAZODONE HYDROCHLORIDE 50 MG/1
TABLET, FILM COATED ORAL
Qty: 30 TABLET | Refills: 0 | Status: SHIPPED | OUTPATIENT
Start: 2024-11-18

## 2024-11-18 NOTE — TELEPHONE ENCOUNTER
Rx Refill Note  Requested Prescriptions     Pending Prescriptions Disp Refills    traZODone (DESYREL) 50 MG tablet [Pharmacy Med Name: TRAZODONE 50MG TABLETS] 30 tablet 0     Sig: TAKE 1/2 TO 1 TABLET BY MOUTH EVERY NIGHT      Last office visit with prescribing clinician: 8/16/2024     Last telemedicine visit with prescribing clinician: 10/15/2024     Next office visit with prescribing clinician: 12/13/2024       Nellie Hilton MA  11/18/24, 09:44 EST

## 2024-11-21 ENCOUNTER — TELEPHONE (OUTPATIENT)
Age: 24
End: 2024-11-21
Payer: COMMERCIAL

## 2024-11-21 DIAGNOSIS — F90.0 ADHD, PREDOMINANTLY INATTENTIVE TYPE: ICD-10-CM

## 2024-11-21 RX ORDER — LISDEXAMFETAMINE DIMESYLATE 40 MG/1
40 CAPSULE ORAL EVERY MORNING
Qty: 30 CAPSULE | Refills: 0 | Status: SHIPPED | OUTPATIENT
Start: 2024-11-21

## 2024-11-21 NOTE — TELEPHONE ENCOUNTER
Tuyet called and is requesting a refill on Vyvanse to be sent to [facility] pharmacy at [address]  Patient be out of town from 12/14/24 - 12/19/24 and will not be taking Vyvanse during this time.  Please call her back to advise if this will cause any issues.

## 2024-11-21 NOTE — TELEPHONE ENCOUNTER
Rx Refill Note  Requested Prescriptions     Pending Prescriptions Disp Refills    lisdexamfetamine (Vyvanse) 40 MG capsule 30 capsule 0     Sig: Take 1 capsule by mouth Every Morning      Last office visit with prescribing clinician: 8/16/2024     Last telemedicine visit with prescribing clinician: 10/15/2024     Next office visit with prescribing clinician: 12/20/2024         Nellie Hilton MA  11/21/24, 15:21 EST

## 2024-11-22 NOTE — TELEPHONE ENCOUNTER
Called patient to advise Rx refill has been sent to pharmacy on file. No answer, left VM. Advised patient to call clinic [phone] if needed.

## 2024-11-26 DIAGNOSIS — F51.05 INSOMNIA DUE TO OTHER MENTAL DISORDER: ICD-10-CM

## 2024-11-26 DIAGNOSIS — F99 INSOMNIA DUE TO OTHER MENTAL DISORDER: ICD-10-CM

## 2024-11-26 DIAGNOSIS — F33.1 MODERATE EPISODE OF RECURRENT MAJOR DEPRESSIVE DISORDER: ICD-10-CM

## 2024-11-26 RX ORDER — TRAZODONE HYDROCHLORIDE 50 MG/1
TABLET, FILM COATED ORAL
Qty: 30 TABLET | Refills: 0 | OUTPATIENT
Start: 2024-11-26

## 2024-12-20 ENCOUNTER — OFFICE VISIT (OUTPATIENT)
Age: 24
End: 2024-12-20
Payer: COMMERCIAL

## 2024-12-20 VITALS
HEART RATE: 103 BPM | SYSTOLIC BLOOD PRESSURE: 124 MMHG | BODY MASS INDEX: 23.24 KG/M2 | DIASTOLIC BLOOD PRESSURE: 82 MMHG | WEIGHT: 126.3 LBS | OXYGEN SATURATION: 98 % | HEIGHT: 62 IN

## 2024-12-20 DIAGNOSIS — F99 INSOMNIA DUE TO OTHER MENTAL DISORDER: ICD-10-CM

## 2024-12-20 DIAGNOSIS — F41.8 SITUATIONAL ANXIETY: ICD-10-CM

## 2024-12-20 DIAGNOSIS — F90.0 ADHD, PREDOMINANTLY INATTENTIVE TYPE: ICD-10-CM

## 2024-12-20 DIAGNOSIS — F51.05 INSOMNIA DUE TO OTHER MENTAL DISORDER: ICD-10-CM

## 2024-12-20 DIAGNOSIS — Z51.81 ENCOUNTER FOR THERAPEUTIC DRUG MONITORING: ICD-10-CM

## 2024-12-20 DIAGNOSIS — F33.1 MODERATE EPISODE OF RECURRENT MAJOR DEPRESSIVE DISORDER: Primary | ICD-10-CM

## 2024-12-20 RX ORDER — BUPROPION HYDROCHLORIDE 150 MG/1
150 TABLET ORAL EVERY MORNING
Qty: 30 TABLET | Refills: 1 | Status: SHIPPED | OUTPATIENT
Start: 2024-12-20

## 2024-12-20 RX ORDER — LISDEXAMFETAMINE DIMESYLATE 40 MG/1
40 CAPSULE ORAL EVERY MORNING
Qty: 30 CAPSULE | Refills: 0 | Status: SHIPPED | OUTPATIENT
Start: 2024-12-20

## 2024-12-20 RX ORDER — TRAZODONE HYDROCHLORIDE 50 MG/1
50 TABLET, FILM COATED ORAL NIGHTLY PRN
Qty: 30 TABLET | Refills: 0 | Status: SHIPPED | OUTPATIENT
Start: 2024-12-20

## 2024-12-20 RX ORDER — BUSPIRONE HYDROCHLORIDE 5 MG/1
5 TABLET ORAL 3 TIMES DAILY
Qty: 90 TABLET | Refills: 1 | Status: SHIPPED | OUTPATIENT
Start: 2024-12-20

## 2024-12-20 NOTE — ADDENDUM NOTE
After Visit Summary   3/12/2018    Meredith Mello    MRN: [id number]           Patient Information     Date Of Birth          1991        Visit Information        Provider Department      3/12/2018 1:00 PM Ashley Hernández PA-C [facility]        Today's Diagnoses     Tonsillar hypertrophy    -  1      Care Instructions    Will call with results     Referral made to ENT, call and schedule           Follow-ups after your visit        Additional Services     OTOLARYNGOLOGY REFERRAL       Your provider has referred you to: [facility]: [facility] [phone]   [email or web address]    Please be aware that coverage of these services is subject to the terms and limitations of your health insurance plan.  Call member services at your health plan with any benefit or coverage questions.      Please bring the following with you to your appointment:    (1) Any X-Rays, CTs or MRIs which have been performed.  Contact the facility where they were done to arrange for  prior to your scheduled appointment.   (2) List of current medications  (3) This referral request   (4) Any documents/labs given to you for this referral                  Who to contact     If you have questions or need follow up information about today's clinic visit or your schedule please contact [facility] directly at [phone].  Normal or non-critical lab and imaging results will be communicated to you by MyChart, letter or phone within 4 business days after the clinic has received the results. If you do not hear from us within 7 days, please contact the clinic through MyChart or phone. If you have a critical or abnormal lab result, we will notify you by phone as soon as possible.  Submit refill requests through [employer] or call your pharmacy and they will forward the refill request to us. Please allow 3 business days for your refill to be  Addended by: KERLINE POST on: 12/20/2024 04:37 PM     Modules accepted: Orders     "completed.          Additional Information About Your Visit        [employer] Information     [employer] lets you send messages to your doctor, view your test results, renew your prescriptions, schedule appointments and more. To sign up, go to [email or web address] . Click on \"Log in\" on the left side of the screen, which will take you to the Welcome page. Then click on \"Sign up Now\" on the right side of the page.     You will be asked to enter the access code listed below, as well as some personal information. Please follow the directions to create your username and password.     Your access code is: [email or web address]  Expires: 3/26/2018  5:36 PM     Your access code will  in 90 days. If you need help or a new code, please call your [place] clinic or [phone].        Care EveryWhere ID     This is your Care EveryWhere ID. This could be used by other organizations to access your [place] medical records  [id number]        Your Vitals Were     Pulse Temperature Respirations Height Pulse Oximetry Breastfeeding?    60 98.2  F (36.8  C) (Oral) 20 5' 6.5\" (1.689 m) 99% No    BMI (Body Mass Index)                   25.12 kg/m2            Blood Pressure from Last 3 Encounters:   18 112/74   17 122/72   17 105/61    Weight from Last 3 Encounters:   18 158 lb (71.7 kg)   17 148 lb 8 oz (67.4 kg)   17 155 lb 6.4 oz (70.5 kg)              We Performed the Following     OTOLARYNGOLOGY REFERRAL     Strep, Rapid Screen        Primary Care Provider Office Phone # Fax #    Rossana Ortiz [phone] [address] [facility] [place] [address]        Equal Access to Services     [facility] JACKIE : Neri Frank, wadhara magallanes, qaybta kaalakosua gómez, brii ocampo. [facility] [phone].    ATENCIÓN: Si habla español, tiene a dsouza disposición servicios gratuitos de asistencia lingüística. Llame al [phone].    We comply with " applicable federal civil rights laws and [place] laws. We do not discriminate on the basis of race, color, national origin, age, disability, sex, sexual orientation, or gender identity.            Thank you!     Thank you for choosing [facility]  for your care. Our goal is always to provide you with excellent care. Hearing back from our patients is one way we can continue to improve our services. Please take a few minutes to complete the written survey that you may receive in the mail after your visit with us. Thank you!             Your Updated Medication List - Protect others around you: Learn how to safely use, store and throw away your medicines at www.disposemymeds.org.          This list is accurate as of 3/12/18  1:17 PM.  Always use your most recent med list.                   Brand Name Dispense Instructions for use Diagnosis    escitalopram 10 MG tablet    LEXAPRO    90 tablet    Take 1 tablet (10 mg) by mouth daily    SHERRI (generalized anxiety disorder)       levonorgestrel 20 MCG/24HR IUD    MIRENA     1 each by Intrauterine route once

## 2024-12-20 NOTE — PROGRESS NOTES
Follow Up Visit        Patient Name: Cecilia Granger  : 2000   MRN: [id number]     Referring Provider: Chapincito Armenta MD    Chief Complaint:       ICD-10-CM ICD-9-CM   1. Moderate episode of recurrent major depressive disorder  F33.1 296.32   2. Insomnia due to other mental disorder  F51.05 300.9    F99 327.02   3. ADHD, predominantly inattentive type  F90.0 314.00   4. Situational anxiety  F41.8 300.09       History of Present Illness    Cecilia Granger is a 24 y.o. female who is here today for follow up related to  ADHD evaluation, insomnia, depression, anxiety.  Patient last seen 10/15/24  at which time Vyvanse i was increased to 40 mg and she was continued on trazodone 50    She has been experiencing significant distress following the discovery of her partner's infidelity, leading to their separation. Patient shares she recently learned her boyfriend of 2 years was cheating on her with 5 women he also had a gambling and porn addiction. This emotional turmoil has manifested as panic attacks, occurring nightly between 3:00 AM and 6:00 AM for the past 3 weeks, and sporadic episodes during the day. She has a history of asthma and has previously used propranolol, which was effective for test anxiety and presentations but not for panic attacks. Hydroxyzine was beneficial but caused excessive sedation. She has tried various antidepressants, including Wellbutrin, which she found beneficial, and Cymbalta and Effexor, which were ineffective. SSRIs were also not helpful. BuSpar was effective in managing her symptoms. She reports difficulty in motivating herself, obsessive and unpleasant thoughts, but no  suicidal ideation. Her anxiety is currently more severe than her depression, which she attributes to her recent breakup and subsequent financial issues. She is considering therapy and is seeking immediate intervention for her anxiety attacks.     She has been using trazodone nightly, taking  half the dose, and reports no morning grogginess unless taken late.  She has noticed an improvement in her binge eating, which she believes is due to the Vyvanse she has been taking for the past few months. She reports low job stress and decreased motivation and feels that her tolerance to Vyvanse builds quickly. She is requesting a refill of her stimulant medication. She has a Kyleena IUD.    Diagnosis:ADHD, depression, anxiety  Medications: none  Therapy: none sustained      Subjective      Review of Systems:   Review of Systems   Constitutional:  Positive for activity change, appetite change and fatigue.   Psychiatric/Behavioral:  Positive for decreased concentration, dysphoric mood and sleep disturbance. The patient is nervous/anxious.    All other systems reviewed and are negative.      PHQ-9 Depression Screening  Little interest or pleasure in doing things?  0   Feeling down, depressed, or hopeless?  1   Trouble falling or staying asleep, or sleeping too much?  1   Feeling tired or having little energy?  2   Poor appetite or overeating?  2   Feeling bad about yourself - or that you are a failure or have let yourself or your family down?  1   Trouble concentrating on things, such as reading the newspaper or watching television?  1   Moving or speaking so slowly that other people could have noticed? Or the opposite - being so fidgety or restless that you have been moving around a lot more than usual?  0   Thoughts that you would be better off dead, or of hurting yourself in some way?  0   PHQ-9 Total Score  8   If you checked off any problems, how difficult have these problems made it for you to do your work, take care of things at home, or get along with other people? Somewhat difficult       ILDEFONSO-7      Over the last two weeks, how often have you been bothered by the following problems?  Feeling nervous, anxious or on edge: Nearly every day  Not being able to stop or control worrying: Nearly every day  Worrying  too much about different things: Nearly every day  Trouble Relaxing: More than half the days  Being so restless that it is hard to sit still: Not at all  Becoming easily annoyed or irritable: Several days  Feeling afraid as if something awful might happen: More than half the days  ILDEFONSO 7 Total Score: 14  If you checked any problems, how difficult have these problems made it for you to do your work, take care of things at home, or get along with other people: Extremely difficult    Patient History:   The following portions of the patient's history were reviewed and updated as appropriate: allergies, current medications, past family history, past medical history, past social history, past surgical history and problem list.     Social History     Socioeconomic History    Marital status: Single   Tobacco Use    Smoking status: Some Days     Types: Cigarettes     Passive exposure: Never    Smokeless tobacco: Never   Vaping Use    Vaping status: Every Day    Substances: Nicotine    Devices: Disposable    Passive vaping exposure: Yes   Substance and Sexual Activity    Alcohol use: Yes     Comment: Socially    Drug use: Not Currently     Types: Marijuana    Sexual activity: Yes     Partners: Male     Birth control/protection: I.U.D.     Past Psychiatric History:   History of outpatient psychiatrist: [facility]  Diagnoses: ADHD anxiety, depression  History of outpatient therapy: x 2 in 2021  Previous Inpatient hospitalizations: none  Previous medication trials:   Focalin,     Adderal IR     Straterra   Concerta-awful overly energized,   Vyvanse.   Focalin    Ritalin.  Wellbutrin helpful   Lamictal,   multiple SSRIs and Prozac caused SI,   Cymbalta-ineffective.   Effexor-ineffecive,   History of suicide/self harm attempts: none    Medications:     Current Outpatient Medications:     cetirizine (zyrTEC) 10 MG tablet, Take 1 tablet by mouth Daily., Disp: , Rfl:     levonorgestrel (KYLEENA) 19.5 MG intrauterine device IUD, To be  inserted one time by prescriber. Route intrauterine., Disp: , Rfl:     lisdexamfetamine (Vyvanse) 40 MG capsule, Take 1 capsule by mouth Every Morning, Disp: 30 capsule, Rfl: 0    traZODone (DESYREL) 50 MG tablet, Take 1 tablet by mouth At Night As Needed for Sleep., Disp: 30 tablet, Rfl: 0    buPROPion XL (Wellbutrin XL) 150 MG 24 hr tablet, Take 1 tablet by mouth Every Morning. Start 2 weeks after Buspar, Disp: 30 tablet, Rfl: 1    busPIRone (BUSPAR) 5 MG tablet, Take 1 tablet by mouth 3 times a day., Disp: 90 tablet, Rfl: 1    Objective     Physical Exam    Vital Signs:   JAZIEL virtual visit    Body mass index is 23.09 kg/m².       Mental Status Exam:   MENTAL STATUS EXAM   General Appearance:  Cleanly groomed and dressed and other  Eye Contact:  Good eye contact  Attitude:  Cooperative  Motor Activity:  Other  Other Comment:  Sitting during virtual visit  Muscle Strength:  Normal  Speech:  Normal rate, tone, volume  Language:  Spontaneous  Mood and affect:  Normal, pleasant  Hopelessness:  Denies  Loneliness: Denies  Thought Process:  Logical and goal-directed  Associations/ Thought Content:  No delusions  Hallucinations:  None  Suicidal Ideations:  Not present  Homicidal Ideation:  Not present  Sensorium:  Alert and clear  Orientation:  Person, place, time and situation  Immediate Recall, Recent, and Remote Memory:  Intact  Attention Span/ Concentration:  Good  Fund of Knowledge:  Appropriate for age and educational level  Intellectual Functioning:  Average range  Insight:  Good  Judgement:  Good  Reliability:  Good  Impulse Control:  Good       @RESULASTCBCDIFFPANEL,TSH,LABLIPI,CTZGPQHA31,SRTNIYIA88,MG,FOLATE,PROLACTIN,CRPRESULT,CMP,E5RHGDWFEDG)@    Lab Results   Component Value Date    GLUCOSE 88 06/04/2021    BUN 9 06/04/2021    CREATININE 0.84 06/04/2021    EGFR Unable to Calculate 05/31/2016    BCR 10.7 06/04/2021    K 4.4 06/04/2021    CO2 29.0 06/04/2021    CALCIUM 9.9 06/04/2021    ALBUMIN 4.6 01/26/2024     BILITOT 0.6 01/26/2024    AST 17 01/26/2024    ALT 11 01/26/2024       Lab Results   Component Value Date    WBC 6.33 01/26/2024    HGB 14.7 01/26/2024    HCT 43.4 01/26/2024    MCV 88.0 01/26/2024     01/26/2024       Lab Results   Component Value Date    CHOL 139 01/26/2024    CHLPL 135 05/31/2016    TRIG 67 01/26/2024    HDL 47 01/26/2024    LDL 78 01/26/2024       Latest Reference Range & Units 06/14/24 15:05   TSH Baseline 0.270 - 4.200 uIU/mL 2.920        Latest Reference Range & Units 06/22/20 10:45   Vitamin B-12 211 - 946 pg/mL 225       Latest Reference Range & Units 06/14/24 15:05   TSH Baseline 0.270 - 4.200 uIU/mL 2.920     Results  Testing  EKG today is within normal limits.         Dispensed  Strength Quantity Days Supply Provider Pharmacy   08/19/2024 Lisdexamfetamine Dimesyla 20MG 30 each 30 [facility]        Assessment / Plan      Visit Diagnosis/Orders Placed This Visit:  Diagnoses and all orders for this visit:    1. Moderate episode of recurrent major depressive disorder (Primary)  -     buPROPion XL (Wellbutrin XL) 150 MG 24 hr tablet; Take 1 tablet by mouth Every Morning. Start 2 weeks after Buspar  Dispense: 30 tablet; Refill: 1  -     traZODone (DESYREL) 50 MG tablet; Take 1 tablet by mouth At Night As Needed for Sleep.  Dispense: 30 tablet; Refill: 0    2. Insomnia due to other mental disorder  -     traZODone (DESYREL) 50 MG tablet; Take 1 tablet by mouth At Night As Needed for Sleep.  Dispense: 30 tablet; Refill: 0    3. ADHD, predominantly inattentive type  -     buPROPion XL (Wellbutrin XL) 150 MG 24 hr tablet; Take 1 tablet by mouth Every Morning. Start 2 weeks after Buspar  Dispense: 30 tablet; Refill: 1  -     lisdexamfetamine (Vyvanse) 40 MG capsule; Take 1 capsule by mouth Every Morning  Dispense: 30 capsule; Refill: 0    4. Situational anxiety  -     busPIRone (BUSPAR) 5 MG tablet; Take 1 tablet by mouth 3 times a day.  Dispense: 90 tablet; Refill:  1        Assessment & Plan    1. Anxiety.  She reports experiencing panic attacks at night and during the day, which have been pervasive and physically symptomatic. BuSpar will be initiated, to be taken 2 to 3 times daily, for a duration of 2 weeks. If well-tolerated, Wellbutrin extended-release will be added to her regimen, to be taken in the morning. Potential side effects, including headache and loss of appetite, were discussed. She is advised to inform a trusted individual about her new medication regimen for monitoring purposes.    2. Depression.  She reports that her depression is impacting her functioning and motivation. Wellbutrin extended-release will be added to her regimen after 2 weeks of starting BuSpar, provided BuSpar is well-tolerated. She is advised to take Wellbutrin in the morning to avoid sleep disruption. Potential side effects, including headache and loss of appetite, were discussed. She is advised to inform a trusted individual about her new medication regimen for monitoring purposes.    3. Binge eating.  She reports improvement in binge eating with the use of Vyvanse. A refill for Vyvanse will be provided. She is advised to monitor her symptoms and report any changes.    4. Medication management.  A refill for trazodone will be provided. She uses trazodone nightly and reports no morning grogginess unless taken too late. She is advised to continue her current dosage and report any issues.    Follow-up  The patient will follow up in 1 month via virtual visit.    Plan:    Kyleena IUD  Continue Vyvanse 40 mg daily; advised to monitor for hypomania  Continue trazodone 25-50 mg at bedtime as needed for insomnia  Start buspar 5 mg tid  Start Wellbutrin  mg daily 2 weeks after starting Buspar.   CPT - 7 atypical scores, strong indication of inattention and some vigilance.   CSA signed, summary given  Pt to bring genesight testing     Treatment and medication options discussed during today's  visit. Patient ackowledged and verbally consented to continue with current treatment plan and was educated on the importance of compliance with treatment and follow-up appointments. Patient seems reasonably able to adhere to treatment plan.        Impression/Formulation: Patient appears alert and oriented.  Patient reports been diagnosed with ADHD in middle school and trailed many stimulants she took until around 11th grade and Focalin worked best.  Reports she is feeling more just disorganized and falling behind at work since her workload has doubled.  Patient also reports noticeable mood switches that occur a few times per month since age 20.  She experiences grandiosity, increased need to socialize, goal directed activity and hypersexuality for 3-4 days and then she is more depressed and isolates and struggles to function.  Patient denies any decrease need for sleep, risky behavior, frivolous spending or increasing physical energy during this time.  She reports chronic insomnia worsening over last few years due to racing thoughts and feelings of guilt.      Pt understands that mood stabilization is priority and then other factors such as ADHD can be managed with mood is supported. Pt reports she has taken multiple SSRIs and able to recall many of them but recalls having SI with Prozac.  She also took Effexor and Cymbalta which was ineffective.  Patient's father is diagnosed with bipolar disorder which increases  patient's heritability tenfold.  Given this factor in her adverse response to SSRIs and SNRIs Abilify will be initiated. Lengthy discussion with patient regarding the potential side effects of antipsychotic medications including: increased cholesterol, increased blood sugar, and possibility of weight gain.  Also discussed the need to routinely monitor labs with the initiation of these medications for the purpose of identifying possible metabolic disturbances, and adjusting medication regimen. The risk of  muscle movement disorders with this class of medication was also discussed and patient advised to notify provider should they occur.  She is agreeable to starting clonidine 0.1 mg at night as needed for insomnia if sleep does not improve.  We will obtain UDS and CPT at follow-up     Stimulant:   Medication options for treatment of ADHD discussed including stimulant and non-stimulant options. Extensive education is provided regarding risks associated with stimulant use including but not limited to:, insomnia, headache, exacerbation of tics, nervousness, irritability, overstimulation, tremor, dizziness, anorexia or change in appetite, nausea, dry mouth, constipation, diarrhea, weight loss, sexual dysfunction, psychotic episodes, seizures, palpitations, tachycardia, hypertension, rare activation (activation of hypomania, calderon, and/or suicidal ideations), cardiovascular adverse effects including sudden death. Patient denies any personal or family history of seizures or structural cardiac abnormalities.     Controlled substance agreement reviewed and signed by patient, Patient  is  informed that the medication is to be used by the patient only, the medication is to be used only as directed, and the medication should not be combined with other substances unless directed by a Provider/Prescriber. I advised patients to avoid taking  medication with alcohol or illicit/unprescribed substances., including THC. Patient understands that habitual use of THC while being prescribed a stimulant will result in provider discontinuing stimulant medication due to the cognition dulling effects of marijuana negating the cognitive enhancing action of the stimulant. The patient verbalizes understanding and agreement with this in their own words, and wishes to pursue proposed treatment plan.     Medication Considerations:  Discussed medication options and treatment plan of prescribed medication(s) as well as the risks, benefits, and  potential side effects. Patient is agreeable to call the office with any worsening of symptoms or onset of side effects. Patient is agreeable to call 911 or go to the nearest ER should he/she begin having SI/HI.    Quality Measures:   Never smoker    I advised Cecilia Granger of the risks of tobacco use.     Follow Up:   Return in about 4 weeks (around 1/17/2025).      MELANIA Greene, [facility]    Patient or patient representative verbalized consent for the use of Ambient Listening during the visit with  MELANIA Grullon for chart documentation. 12/20/2024  13:32 EDT    Answers submitted by the patient for this visit:  Primary Reason for Visit (Submitted on 12/20/2024)  What is the primary reason for your visit?: Problem Not Listed  Problem not listed (Submitted on 12/20/2024)  Chief Complaint: Other medical problem  Reason for appointment: Medication follow up  anorexia: No  joint pain: No  change in stool: Yes  joint swelling: No  swollen glands: No  vertigo: No  visual change: No  Onset: 1 to 6 months  Medications tried: N/A  Additional information: Recent onset of depression

## 2024-12-28 LAB
1OH-MIDAZOLAM UR QL SCN: NOT DETECTED NG/MG CREAT
6MAM UR QL SCN: NEGATIVE NG/ML
7AMINOCLONAZEPAM/CREAT UR: NOT DETECTED NG/MG CREAT
A-OH ALPRAZ/CREAT UR: NOT DETECTED NG/MG CREAT
A-OH-TRIAZOLAM/CREAT UR CFM: NOT DETECTED NG/MG CREAT
ACP UR QL CFM: NOT DETECTED
ALPRAZ/CREAT UR CFM: NOT DETECTED NG/MG CREAT
AMPHETAMINES UR QL SCN: NEGATIVE NG/ML
APAP UR QL SCN: NEGATIVE UG/ML
BARBITURATES UR QL SCN: NEGATIVE NG/ML
BENZODIAZ SCN METH UR: NEGATIVE
BUPRENORPHINE UR QL SCN: NEGATIVE
BUPRENORPHINE/CREAT UR: NOT DETECTED NG/MG CREAT
CANNABINOIDS UR QL SCN: NEGATIVE NG/ML
CARISOPRODOL UR QL: NEGATIVE NG/ML
CLONAZEPAM/CREAT UR CFM: NOT DETECTED NG/MG CREAT
COCAINE+BZE UR QL SCN: NEGATIVE NG/ML
CREAT UR-MCNC: 77 MG/DL
D-METHORPHAN UR-MCNC: NOT DETECTED NG/ML
D-METHORPHAN+LEVORPHANOL UR QL: NOT DETECTED
DESALKYLFLURAZ/CREAT UR: NOT DETECTED NG/MG CREAT
DIAZEPAM/CREAT UR: NOT DETECTED NG/MG CREAT
ETG ETS UR QL CFM: NORMAL
ETHANOL UR QL SCN: NEGATIVE G/DL
ETHANOL UR QL SCN: NORMAL NG/ML
ETHYL GLUCURONIDE UR CFM-MCNC: 3364 NG/MG CREAT
ETHYL SULFATE UR CFM-MCNC: 784 NG/MG CREAT
FENTANYL CTO UR SCN-MCNC: NEGATIVE NG/ML
FENTANYL/CREAT UR: NOT DETECTED NG/MG CREAT
FLUNITRAZEPAM UR QL SCN: NOT DETECTED NG/MG CREAT
GABAPENTIN UR-MCNC: NEGATIVE UG/ML
HALLUCINOGENS UR: NEGATIVE
HYPNOTICS UR QL SCN: NEGATIVE
KETAMINE UR QL: NOT DETECTED
LORAZEPAM/CREAT UR: NOT DETECTED NG/MG CREAT
MEPERIDINE UR QL SCN: NEGATIVE NG/ML
METHADONE UR QL SCN: NEGATIVE NG/ML
METHADONE+METAB UR QL SCN: NEGATIVE NG/ML
MIDAZOLAM/CREAT UR CFM: NOT DETECTED NG/MG CREAT
MISCELLANEOUS, UR: NEGATIVE
NORBUPRENORPHINE/CREAT UR: NOT DETECTED NG/MG CREAT
NORDIAZEPAM/CREAT UR: NOT DETECTED NG/MG CREAT
NORFENTANYL/CREAT UR: NOT DETECTED NG/MG CREAT
NORFLUNITRAZEPAM UR-MCNC: NOT DETECTED NG/MG CREAT
NORKETAMINE UR-MCNC: NOT DETECTED UG/ML
OPIATES UR SCN-MCNC: NEGATIVE NG/ML
OXAZEPAM/CREAT UR: NOT DETECTED NG/MG CREAT
OXYCODONE CTO UR SCN-MCNC: NEGATIVE NG/ML
PCP UR QL SCN: NEGATIVE NG/ML
PRESCRIBED MEDICATIONS: NORMAL
PROPOXYPH UR QL SCN: NEGATIVE NG/ML
TAPENTADOL CTO UR SCN-MCNC: NEGATIVE NG/ML
TEMAZEPAM/CREAT UR: NOT DETECTED NG/MG CREAT
TRAMADOL UR QL SCN: NEGATIVE NG/ML
ZALEPLON UR-MCNC: NOT DETECTED NG/ML
ZOLPIDEM PHENYL-4-CARB UR QL SCN: NOT DETECTED
ZOLPIDEM UR QL SCN: NOT DETECTED
ZOPICLONE-N-OXIDE UR-MCNC: NOT DETECTED NG/ML

## 2025-01-20 ENCOUNTER — TELEMEDICINE (OUTPATIENT)
Age: 25
End: 2025-01-20
Payer: COMMERCIAL

## 2025-01-20 VITALS — BODY MASS INDEX: 22.49 KG/M2 | WEIGHT: 123 LBS

## 2025-01-20 DIAGNOSIS — F41.8 SITUATIONAL ANXIETY: ICD-10-CM

## 2025-01-20 DIAGNOSIS — F33.1 MODERATE EPISODE OF RECURRENT MAJOR DEPRESSIVE DISORDER: Primary | ICD-10-CM

## 2025-01-20 DIAGNOSIS — F99 INSOMNIA DUE TO OTHER MENTAL DISORDER: ICD-10-CM

## 2025-01-20 DIAGNOSIS — F51.05 INSOMNIA DUE TO OTHER MENTAL DISORDER: ICD-10-CM

## 2025-01-20 DIAGNOSIS — F90.0 ADHD, PREDOMINANTLY INATTENTIVE TYPE: ICD-10-CM

## 2025-01-20 PROCEDURE — 96127 BRIEF EMOTIONAL/BEHAV ASSMT: CPT | Performed by: NURSE PRACTITIONER

## 2025-01-20 PROCEDURE — 99214 OFFICE O/P EST MOD 30 MIN: CPT | Performed by: NURSE PRACTITIONER

## 2025-01-20 RX ORDER — TRAZODONE HYDROCHLORIDE 50 MG/1
25-50 TABLET, FILM COATED ORAL NIGHTLY PRN
Qty: 30 TABLET | Refills: 0 | Status: SHIPPED | OUTPATIENT
Start: 2025-01-20

## 2025-01-20 RX ORDER — BUPROPION HYDROCHLORIDE 150 MG/1
150 TABLET ORAL EVERY MORNING
Qty: 90 TABLET | Refills: 0 | Status: SHIPPED | OUTPATIENT
Start: 2025-01-20

## 2025-01-20 RX ORDER — BUSPIRONE HYDROCHLORIDE 5 MG/1
5 TABLET ORAL 3 TIMES DAILY
Qty: 90 TABLET | Refills: 1 | Status: SHIPPED | OUTPATIENT
Start: 2025-01-20

## 2025-01-20 RX ORDER — LISDEXAMFETAMINE DIMESYLATE 40 MG/1
40 CAPSULE ORAL EVERY MORNING
Qty: 30 CAPSULE | Refills: 0 | Status: SHIPPED | OUTPATIENT
Start: 2025-01-20

## 2025-01-20 NOTE — PROGRESS NOTES
Telehealth follow Up Visit    This provider is located at  [facility], [facility], located at [address] [address], [facility], [address] and is conducting  a secure MyChart Video Visit through [employer]. Patient is being evaluated today  at their home address in [place], and states they are  in a secure environment for this appointment. The patient consents to be seen remotely, and when consent is given they understand that the consent allows for patient identifiable information to be sent to a third party as needed. They may refuse to be seen remotely at any time. The electronic data is encrypted and password protected, and the patient  has been advised of the potential risks to privacy not withstanding such measures. The patient's condition being diagnosed/treated is appropriate for telemedicine. The provider identified herself as well as her credentials to patient. Patient identity confirmed by asking to confirm their name and     Patient Name: Cecilia Granger  : 2000   MRN: [id number]     Referring Provider: Chapincito Armenta MD    Chief Complaint:       ICD-10-CM ICD-9-CM   1. Moderate episode of recurrent major depressive disorder  F33.1 296.32   2. Insomnia due to other mental disorder  F51.05 300.9    F99 327.02   3. ADHD, predominantly inattentive type  F90.0 314.00   4. Situational anxiety  F41.8 300.09     History of Present Illness     Cecilia Granger is a 24 y.o. female who is here today for follow up related to  ADHD evaluation, insomnia, depression, anxiety.  She was last seen on 2024, at which time she was continued on Vyvanse, trazodone, and Wellbutrin, and BuSpar was started.    She reports a need for an increased dosage of Vyvanse, as she perceives it to be less effective than before. She takes Vyvanse around 8:30 in the morning and feels it wears off around 2:00 PM. She has been inconsistent with her Wellbutrin intake, often forgetting  to take it. She has been taking Wellbutrin for 5 consecutive days, then lost the bottle, and then took it again for 2 days. She has not taken her Vyvanse today due to a lack of morning activities. She is preparing for her [employer] [facility] exam and is seeking additional services or accommodations due to her ADHD diagnosis. She is uncertain if a note from the provider will be necessary.    Her anxiety has shown improvement with BuSpar, and the frequency of panic attacks has decreased. She has been adhering to her BuSpar regimen, taking it three times daily, although she occasionally forgets a dose. She reports no side effects from her medications. Her appetite remains normal, with occasional days of increased eating, which she attributes to stress. She is unsure of her current weight but notes that her jeans fit better. She experiences fluctuations in her stress levels and relationships, with some days being manageable and others being challenging. She acknowledges a tendency to focus on negative aspects, which exacerbates her feelings of distress.She reports an overall improvement in her mood and depression, although she experiences 3 to 4 days per week of feeling depressed or unhappy.       She reports improved sleep quality since starting trazodone. She has been attempting to reduce her trazodone intake, previously taking it nightly but now only taking half a dose. She is trying to use it less and less to see if she can eventually stop using it. She is requesting a refill of her trazodone prescription. She has previously tried prazosin without significant benefit.She continues to experience nightmares every 2 days, which she believes are stress-induced. She also reports sleep paralysis. She has been ill and taking DayQuil and NyQuil, which she believes contribute to her unusual dreams. She was unable to fall asleep until 3 AM last night due to these dreams. She did not take her trazodone last night. 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    Diagnosis:ADHD, depression, anxiety  Medications: none  Therapy: none sustained      Subjective      Review of Systems:   Review of Systems   Constitutional:  Positive for activity change, appetite change and fatigue.   Psychiatric/Behavioral:  Positive for decreased concentration, dysphoric mood and sleep disturbance. The patient is nervous/anxious.    All other systems reviewed and are negative.      PHQ-9 Depression Screening  Little interest or pleasure in doing things?  0   Feeling down, depressed, or hopeless?  1   Trouble falling or staying asleep, or sleeping too much?  1   Feeling tired or having little energy?  2   Poor appetite or overeating?  2   Feeling bad about yourself - or that you are a failure or have let yourself or your family down?  1   Trouble concentrating on things, such as reading the newspaper or watching television?  1   Moving or speaking so slowly that other people could have noticed? Or the opposite - being so fidgety or restless that you have been moving around a lot more than usual?  0   Thoughts that you would be better off dead, or of hurting yourself in some way?  0   PHQ-9 Total Score  8   If you checked off any problems, how difficult have these problems made it for you to do your work, take care of things at home, or get along with other people? Somewhat difficult       ILDEFONSO-7      Over the last two weeks, how often have you been bothered by the following problems?  Feeling nervous, anxious or on edge: (Patient-Rptd) More than half the days  Not being able to stop or control worrying: (Patient-Rptd) More than half the days  Worrying too much about different things: (Patient-Rptd) More than half the days  Trouble Relaxing: (Patient-Rptd) More than half the days  Being so restless that it is hard to sit still: (Patient-Rptd) Several days  Becoming easily annoyed or irritable: (Patient-Rptd)  More than half the days  Feeling afraid as if something awful might happen: (Patient-Rptd) Several days  ILDEFONSO 7 Total Score: (Patient-Rptd) 12  If you checked any problems, how difficult have these problems made it for you to do your work, take care of things at home, or get along with other people: (Patient-Rptd) Very difficult    Patient History:   The following portions of the patient's history were reviewed and updated as appropriate: allergies, current medications, past family history, past medical history, past social history, past surgical history and problem list.     Social History     Socioeconomic History    Marital status: Single   Tobacco Use    Smoking status: Some Days     Types: Cigarettes     Passive exposure: Never    Smokeless tobacco: Never   Vaping Use    Vaping status: Every Day    Substances: Nicotine    Devices: Disposable    Passive vaping exposure: Yes   Substance and Sexual Activity    Alcohol use: Yes     Comment: Socially    Drug use: Not Currently     Types: Marijuana    Sexual activity: Yes     Partners: Male     Birth control/protection: I.U.D.     Past Psychiatric History:   History of outpatient psychiatrist: [facility]  Diagnoses: ADHD anxiety, depression  History of outpatient therapy: x 2 in 2021  Previous Inpatient hospitalizations: none  Previous medication trials:   Focalin,     Adderal IR     Straterra   Concerta-awful overly energized,   Vyvanse.   Focalin    Ritalin.  Wellbutrin helpful   Lamictal,   multiple SSRIs and Prozac caused SI,   Cymbalta-ineffective.   Effexor-ineffecive,   History of suicide/self harm attempts: none    Medications:     Current Outpatient Medications:     buPROPion XL (Wellbutrin XL) 150 MG 24 hr tablet, Take 1 tablet by mouth Every Morning. Start 2 weeks after Buspar, Disp: 90 tablet, Rfl: 0    busPIRone (BUSPAR) 5 MG tablet, Take 1 tablet by mouth 3 times a day., Disp: 90 tablet, Rfl: 1    lisdexamfetamine (Vyvanse) 40 MG capsule, Take 1 capsule by  mouth Every Morning, Disp: 30 capsule, Rfl: 0    traZODone (DESYREL) 50 MG tablet, Take 0.5-1 tablets by mouth At Night As Needed for Sleep., Disp: 30 tablet, Rfl: 0    cetirizine (zyrTEC) 10 MG tablet, Take 1 tablet by mouth Daily., Disp: , Rfl:     levonorgestrel (KYLEENA) 19.5 MG intrauterine device IUD, To be inserted one time by prescriber. Route intrauterine., Disp: , Rfl:     Objective     Physical Exam  Vital Signs  Weight was 126 last time.  Vital Signs:   JAZIEL virtual visit    Body mass index is 22.49 kg/m².       Mental Status Exam:   MENTAL STATUS EXAM   General Appearance:  Cleanly groomed and dressed and other  Eye Contact:  Good eye contact  Attitude:  Cooperative  Motor Activity:  Other  Other Comment:  Sitting during virtual visit  Muscle Strength:  Normal  Speech:  Normal rate, tone, volume  Language:  Spontaneous  Mood and affect:  Normal, pleasant  Hopelessness:  Denies  Loneliness: Denies  Thought Process:  Logical and goal-directed  Associations/ Thought Content:  No delusions  Hallucinations:  None  Suicidal Ideations:  Not present  Homicidal Ideation:  Not present  Sensorium:  Alert and clear  Orientation:  Person, place, time and situation  Immediate Recall, Recent, and Remote Memory:  Intact  Attention Span/ Concentration:  Good  Fund of Knowledge:  Appropriate for age and educational level  Intellectual Functioning:  Average range  Insight:  Good  Judgement:  Good  Reliability:  Good  Impulse Control:  Good       @RESULASTCBCDIFFPANEL,TSH,LABLIPI,VYUAGRSJ25,SQHMQOMH62,MG,FOLATE,PROLACTIN,CRPRESULT,CMP,L6YOCJOHHXH)@    Lab Results   Component Value Date    GLUCOSE 88 06/04/2021    BUN 9 06/04/2021    CREATININE 0.84 06/04/2021    EGFR Unable to Calculate 05/31/2016    BCR 10.7 06/04/2021    K 4.4 06/04/2021    CO2 29.0 06/04/2021    CALCIUM 9.9 06/04/2021    ALBUMIN 4.6 01/26/2024    BILITOT 0.6 01/26/2024    AST 17 01/26/2024    ALT 11 01/26/2024       Lab Results   Component Value Date     WBC 6.33 01/26/2024    HGB 14.7 01/26/2024    HCT 43.4 01/26/2024    MCV 88.0 01/26/2024     01/26/2024       Lab Results   Component Value Date    CHOL 139 01/26/2024    CHLPL 135 05/31/2016    TRIG 67 01/26/2024    HDL 47 01/26/2024    LDL 78 01/26/2024       Latest Reference Range & Units 06/14/24 15:05   TSH Baseline 0.270 - 4.200 uIU/mL 2.920        Latest Reference Range & Units 06/22/20 10:45   Vitamin B-12 211 - 946 pg/mL 225       Latest Reference Range & Units 06/14/24 15:05   TSH Baseline 0.270 - 4.200 uIU/mL 2.920     Results  Testing  EKG today is within normal limits.        ENMANUEL  Dispensed  Strength Quantity Days Supply Provider Pharmacy   12/23/2024 Lisdexamfetamine Dimesyla 40MG 30 each 30 [facility]   11/21/2024 Lisdexamfetamine Dimesyla 40MG 30 each 30 [facility]   10/19/2024 Lisdexamfetamine Dimesyla 40MG 30 each 30 [facility]   09/18/2024 Lisdexamfetamine Dimesyla 30MG 30 each 30 [facility]       Assessment / Plan      Visit Diagnosis/Orders Placed This Visit:  Diagnoses and all orders for this visit:    1. Moderate episode of recurrent major depressive disorder (Primary)  -     buPROPion XL (Wellbutrin XL) 150 MG 24 hr tablet; Take 1 tablet by mouth Every Morning. Start 2 weeks after Buspar  Dispense: 90 tablet; Refill: 0  -     traZODone (DESYREL) 50 MG tablet; Take 0.5-1 tablets by mouth At Night As Needed for Sleep.  Dispense: 30 tablet; Refill: 0    2. Insomnia due to other mental disorder  -     traZODone (DESYREL) 50 MG tablet; Take 0.5-1 tablets by mouth At Night As Needed for Sleep.  Dispense: 30 tablet; Refill: 0    3. ADHD, predominantly inattentive type  -     lisdexamfetamine (Vyvanse) 40 MG capsule; Take 1 capsule by mouth Every Morning  Dispense: 30 capsule; Refill: 0  -     buPROPion XL (Wellbutrin XL) 150 MG 24 hr tablet; Take 1 tablet by mouth Every Morning. Start 2 weeks after Buspar   Dispense: 90 tablet; Refill: 0    4. Situational anxiety  -     busPIRone (BUSPAR) 5 MG tablet; Take 1 tablet by mouth 3 times a day.  Dispense: 90 tablet; Refill: 1      Assessment & Plan    1. Attention deficit hyperactivity disorder (ADHD).  She reports that her Vyvanse is wearing off earlier than expected, but an increase in dosage is not recommended at this time as it was increased in October. She has been inconsistent with her Wellbutrin intake. She is advised to take Wellbutrin 150 mg concurrently with Vyvanse in the morning to enhance its effectiveness. A pill planner is recommended to help maintain consistency. She is informed that both Vyvanse and Wellbutrin can increase heart rate. A refill for Vyvanse will be sent to [facility].    2. Anxiety.  She reports improvement in anxiety symptoms with BuSpar 5 mg three times a day. She is advised to continue this regimen. Refills for BuSpar will be provided.    3. Depression.  She reports feeling depressed 3 to 4 days a week. She is advised to take Wellbutrin 150 mg consistently in the morning. Therapy is suggested as an option if her condition does not improve. Refills for Wellbutrin will be provided.    4. Sleep issues.  She reports experiencing nightmares and sleep paralysis, which may be stress-induced. She is currently taking trazodone and has been trying to reduce the dosage. She is advised to continue monitoring her sleep and adjust trazodone as needed. Refills for trazodone will be provided.    Follow-up  The patient will follow up in 2 months.    Plan:    Kyleena IUD  Continue Vyvanse 40 mg daily; advised to monitor for hypomania  Continue trazodone 25-50 mg at bedtime as needed for insomnia  Continue buspar 5 mg tid  Be more consistent in taking  Wellbutrin  mg daily   CPT - 7 atypical scores, strong indication of inattention and some vigilance.   CSA signed, summary given  Pt to bring [employer] testing     Treatment and  medication options discussed during today's visit. Patient ackowledged and verbally consented to continue with current treatment plan and was educated on the importance of compliance with treatment and follow-up appointments. Patient seems reasonably able to adhere to treatment plan.      Impression/Formulation: Patient appears alert and oriented.  Patient reports been diagnosed with ADHD in middle school and trailed many stimulants she took until around 11th grade and Focalin worked best.  Reports she is feeling more just disorganized and falling behind at work since her workload has doubled.  Patient also reports noticeable mood switches that occur a few times per month since age 20.  She experiences grandiosity, increased need to socialize, goal directed activity and hypersexuality for 3-4 days and then she is more depressed and isolates and struggles to function.  Patient denies any decrease need for sleep, risky behavior, frivolous spending or increasing physical energy during this time.  She reports chronic insomnia worsening over last few years due to racing thoughts and feelings of guilt.      Pt understands that mood stabilization is priority and then other factors such as ADHD can be managed with mood is supported. Pt reports she has taken multiple SSRIs and able to recall many of them but recalls having SI with Prozac.  She also took Effexor and Cymbalta which was ineffective.  Patient's father is diagnosed with bipolar disorder which increases  patient's heritability tenfold.  Given this factor in her adverse response to SSRIs and SNRIs Abilify will be initiated. Lengthy discussion with patient regarding the potential side effects of antipsychotic medications including: increased cholesterol, increased blood sugar, and possibility of weight gain.  Also discussed the need to routinely monitor labs with the initiation of these medications for the purpose of identifying possible metabolic disturbances, and  adjusting medication regimen. The risk of muscle movement disorders with this class of medication was also discussed and patient advised to notify provider should they occur.  She is agreeable to starting clonidine 0.1 mg at night as needed for insomnia if sleep does not improve.  We will obtain UDS and CPT at follow-up     Stimulant:   Medication options for treatment of ADHD discussed including stimulant and non-stimulant options. Extensive education is provided regarding risks associated with stimulant use including but not limited to:, insomnia, headache, exacerbation of tics, nervousness, irritability, overstimulation, tremor, dizziness, anorexia or change in appetite, nausea, dry mouth, constipation, diarrhea, weight loss, sexual dysfunction, psychotic episodes, seizures, palpitations, tachycardia, hypertension, rare activation (activation of hypomania, calderon, and/or suicidal ideations), cardiovascular adverse effects including sudden death. Patient denies any personal or family history of seizures or structural cardiac abnormalities.     Controlled substance agreement reviewed and signed by patient, Patient  is  informed that the medication is to be used by the patient only, the medication is to be used only as directed, and the medication should not be combined with other substances unless directed by a Provider/Prescriber. I advised patients to avoid taking  medication with alcohol or illicit/unprescribed substances., including THC. Patient understands that habitual use of THC while being prescribed a stimulant will result in provider discontinuing stimulant medication due to the cognition dulling effects of marijuana negating the cognitive enhancing action of the stimulant. The patient verbalizes understanding and agreement with this in their own words, and wishes to pursue proposed treatment plan.     Medication Considerations:  Discussed medication options and treatment plan of prescribed medication(s)  as well as the risks, benefits, and potential side effects. Patient is agreeable to call the office with any worsening of symptoms or onset of side effects. Patient is agreeable to call 911 or go to the nearest ER should he/she begin having SI/HI.    Quality Measures:   Never smoker    I advised Cecilia Granger of the risks of tobacco use.     Follow Up:   Return in about 2 months (around 3/20/2025).      MELANIA Greene, [facility]    Patient or patient representative verbalized consent for the use of Ambient Listening during the visit with  MELANIA Grullon for chart documentation. 1/20/2025  13:32 EDT    Answers submitted by the patient for this visit:  Primary Reason for Visit (Submitted on 12/20/2024)  What is the primary reason for your visit?: Problem Not Listed  Problem not listed (Submitted on 12/20/2024)  Chief Complaint: Other medical problem  Reason for appointment: Medication follow up  anorexia: No  joint pain: No  change in stool: Yes  joint swelling: No  swollen glands: No  vertigo: No  visual change: No  Onset: 1 to 6 months  Medications tried: N/A  Additional information: Recent onset of depression

## 2025-02-26 DIAGNOSIS — F90.0 ADHD, PREDOMINANTLY INATTENTIVE TYPE: ICD-10-CM

## 2025-02-26 DIAGNOSIS — F33.1 MODERATE EPISODE OF RECURRENT MAJOR DEPRESSIVE DISORDER: ICD-10-CM

## 2025-02-26 DIAGNOSIS — F99 INSOMNIA DUE TO OTHER MENTAL DISORDER: ICD-10-CM

## 2025-02-26 DIAGNOSIS — F51.05 INSOMNIA DUE TO OTHER MENTAL DISORDER: ICD-10-CM

## 2025-02-27 DIAGNOSIS — F33.1 MODERATE EPISODE OF RECURRENT MAJOR DEPRESSIVE DISORDER: ICD-10-CM

## 2025-02-27 DIAGNOSIS — F99 INSOMNIA DUE TO OTHER MENTAL DISORDER: ICD-10-CM

## 2025-02-27 DIAGNOSIS — F51.05 INSOMNIA DUE TO OTHER MENTAL DISORDER: ICD-10-CM

## 2025-02-27 RX ORDER — TRAZODONE HYDROCHLORIDE 50 MG/1
25-50 TABLET ORAL NIGHTLY PRN
Qty: 30 TABLET | Refills: 0 | OUTPATIENT
Start: 2025-02-27

## 2025-02-27 RX ORDER — LISDEXAMFETAMINE DIMESYLATE 40 MG/1
40 CAPSULE ORAL EVERY MORNING
Qty: 30 CAPSULE | Refills: 0 | Status: SHIPPED | OUTPATIENT
Start: 2025-02-27

## 2025-02-27 RX ORDER — TRAZODONE HYDROCHLORIDE 50 MG/1
25-50 TABLET ORAL NIGHTLY PRN
Qty: 30 TABLET | Refills: 0 | Status: SHIPPED | OUTPATIENT
Start: 2025-02-27

## 2025-03-29 DIAGNOSIS — F33.1 MODERATE EPISODE OF RECURRENT MAJOR DEPRESSIVE DISORDER: ICD-10-CM

## 2025-03-29 DIAGNOSIS — F51.05 INSOMNIA DUE TO OTHER MENTAL DISORDER: ICD-10-CM

## 2025-03-29 DIAGNOSIS — F99 INSOMNIA DUE TO OTHER MENTAL DISORDER: ICD-10-CM

## 2025-03-31 RX ORDER — TRAZODONE HYDROCHLORIDE 50 MG/1
25-50 TABLET ORAL NIGHTLY PRN
Qty: 30 TABLET | Refills: 0 | Status: SHIPPED | OUTPATIENT
Start: 2025-03-31 | End: 2025-04-03 | Stop reason: SDUPTHER

## 2025-04-03 ENCOUNTER — OFFICE VISIT (OUTPATIENT)
Age: 25
End: 2025-04-03
Payer: COMMERCIAL

## 2025-04-03 VITALS
HEIGHT: 62 IN | OXYGEN SATURATION: 93 % | BODY MASS INDEX: 23.24 KG/M2 | DIASTOLIC BLOOD PRESSURE: 68 MMHG | HEART RATE: 74 BPM | WEIGHT: 126.3 LBS | SYSTOLIC BLOOD PRESSURE: 104 MMHG

## 2025-04-03 DIAGNOSIS — F99 INSOMNIA DUE TO OTHER MENTAL DISORDER: ICD-10-CM

## 2025-04-03 DIAGNOSIS — F51.05 INSOMNIA DUE TO OTHER MENTAL DISORDER: ICD-10-CM

## 2025-04-03 DIAGNOSIS — F90.0 ADHD, PREDOMINANTLY INATTENTIVE TYPE: Primary | ICD-10-CM

## 2025-04-03 DIAGNOSIS — Z51.81 ENCOUNTER FOR THERAPEUTIC DRUG MONITORING: ICD-10-CM

## 2025-04-03 RX ORDER — TRAZODONE HYDROCHLORIDE 50 MG/1
25-50 TABLET ORAL NIGHTLY PRN
Qty: 30 TABLET | Refills: 0 | Status: SHIPPED | OUTPATIENT
Start: 2025-04-03

## 2025-04-03 RX ORDER — LISDEXAMFETAMINE DIMESYLATE 50 MG/1
50 CAPSULE ORAL EVERY MORNING
Qty: 30 CAPSULE | Refills: 0 | Status: SHIPPED | OUTPATIENT
Start: 2025-04-03

## 2025-04-03 NOTE — PROGRESS NOTES
Office follow Up Visit         Patient Name: Cecilia Granger  : 2000   MRN: [id number]     Referring Provider: Chapincito Armenta MD    Chief Complaint:       ICD-10-CM ICD-9-CM   1. ADHD, predominantly inattentive type  F90.0 314.00   2. Insomnia due to other mental disorder  F51.05 300.9    F99 327.02   3. Encounter for therapeutic drug monitoring  Z51.81 V58.83     History of Present Illness     Cecilia Granger is a 24 y.o. female who is here today for follow up related to  ADHD evaluation, insomnia, depression, anxiety.        She was last seen 2025 at which time she was continued on Vyvanse trazodone Wellbutrin and BuSpar was added. She reports a significant improvement in her anxiety and depression symptoms, attributing this to the stability provided by her current medication regimen. Her sleep quality has also improved, with less frequent use of trazodone. She typically takes half a dose of trazodone if she has work the following day and a full dose otherwise. There have been instances where severe insomnia necessitated an increase in the trazodone dosage. However, recent episodes of insomnia have been less severe, and she has not experienced any nightmares. She is currently preparing for an exam on 2025, which she anticipates will increase her stress levels.    Her primary concern remains her ADHD, for which she continues to take Vyvanse. She experiences a rapid onset of fatigue approximately 2 to 3 hours after taking her medication, often resulting in a need for a nap. Despite this, she has noticed an improvement in her ability to eat while on the medication. She does not adhere to a strict medication schedule, sometimes taking it at noon if she has no morning commitments. She reports no side effects from the medication. She is currently on Vyvanse and trazodone.    She is currently on B12 supplements and probiotic. She is due for blood work with her primary care  physician due to potential thyroid issues, as evidenced by hot flashes, changes in cravings, and urinary issues. She is also scheduled to receive B12 injections from her primary care physician. Her diet is predominantly protein-based, with a high intake of meat.    MEDICATIONS  Vyvanse, trazodone, B12 supplements, probiotic.    Diagnosis:ADHD, depression, anxiety  Medications: none  Therapy: none sustained      Subjective      Review of Systems:   Review of Systems   Constitutional:  Positive for activity change, appetite change and fatigue.   Psychiatric/Behavioral:  Positive for decreased concentration, dysphoric mood and sleep disturbance. The patient is nervous/anxious.    All other systems reviewed and are negative.      PHQ-9 Depression Screening  Little interest or pleasure in doing things?  0   Feeling down, depressed, or hopeless?  1   Trouble falling or staying asleep, or sleeping too much?  1   Feeling tired or having little energy?  2   Poor appetite or overeating?  2   Feeling bad about yourself - or that you are a failure or have let yourself or your family down?  1   Trouble concentrating on things, such as reading the newspaper or watching television?  1   Moving or speaking so slowly that other people could have noticed? Or the opposite - being so fidgety or restless that you have been moving around a lot more than usual?  0   Thoughts that you would be better off dead, or of hurting yourself in some way?  0   PHQ-9 Total Score  8   If you checked off any problems, how difficult have these problems made it for you to do your work, take care of things at home, or get along with other people? Somewhat difficult       ILDEFONSO-7      Over the last two weeks, how often have you been bothered by the following problems?  Feeling nervous, anxious or on edge: Several days  Not being able to stop or control worrying: Not at all  Worrying too much about different things: Several days  Trouble Relaxing: Not at  all  Being so restless that it is hard to sit still: Not at all  Becoming easily annoyed or irritable: More than half the days  Feeling afraid as if something awful might happen: Not at all  ILDEFONSO 7 Total Score: 4  If you checked any problems, how difficult have these problems made it for you to do your work, take care of things at home, or get along with other people: Not difficult at all    Patient History:   The following portions of the patient's history were reviewed and updated as appropriate: allergies, current medications, past family history, past medical history, past social history, past surgical history and problem list.     Social History     Socioeconomic History    Marital status: Single   Tobacco Use    Smoking status: Some Days     Types: Cigarettes     Passive exposure: Never    Smokeless tobacco: Never   Vaping Use    Vaping status: Every Day    Substances: Nicotine    Devices: Disposable    Passive vaping exposure: Yes   Substance and Sexual Activity    Alcohol use: Yes     Comment: Socially    Drug use: Not Currently     Types: Marijuana    Sexual activity: Yes     Partners: Male     Birth control/protection: I.U.D.     Past Psychiatric History:   History of outpatient psychiatrist: [facility]  Diagnoses: ADHD anxiety, depression  History of outpatient therapy: x 2 in 2021  Previous Inpatient hospitalizations: none  Previous medication trials:   Focalin,     Adderal IR     Straterra   Concerta-awful overly energized,   Vyvanse.   Focalin    Ritalin.  Wellbutrin helpful   Lamictal,   multiple SSRIs and Prozac caused SI,   Cymbalta-ineffective.   Effexor-ineffecive,   History of suicide/self harm attempts: none    Medications:     Current Outpatient Medications:     cetirizine (zyrTEC) 10 MG tablet, Take 1 tablet by mouth Daily., Disp: , Rfl:     levonorgestrel (KYLEENA) 19.5 MG intrauterine device IUD, To be inserted one time by prescriber. Route intrauterine., Disp: , Rfl:     traZODone (DESYREL) 50 MG  tablet, Take 0.5-1 tablets by mouth At Night As Needed for Sleep., Disp: 30 tablet, Rfl: 0    lisdexamfetamine (Vyvanse) 50 MG capsule, Take 1 capsule by mouth Every Morning, Disp: 30 capsule, Rfl: 0    Objective     Physical Exam  Vital Signs  Weight was 126 last time.  Vital Signs:   JAZIEL virtual visit    Body mass index is 23.09 kg/m².       Mental Status Exam:   MENTAL STATUS EXAM   General Appearance:  Cleanly groomed and dressed and other  Eye Contact:  Good eye contact  Attitude:  Cooperative  Motor Activity:  Normal gait, posture  Muscle Strength:  Normal  Speech:  Normal rate, tone, volume  Language:  Spontaneous  Mood and affect:  Normal, pleasant  Hopelessness:  Denies  Loneliness: Denies  Thought Process:  Logical and goal-directed  Associations/ Thought Content:  No delusions  Hallucinations:  None  Suicidal Ideations:  Not present  Homicidal Ideation:  Not present  Sensorium:  Alert and clear  Orientation:  Person, place, time and situation  Immediate Recall, Recent, and Remote Memory:  Intact  Attention Span/ Concentration:  Good  Fund of Knowledge:  Appropriate for age and educational level  Intellectual Functioning:  Average range  Insight:  Good  Judgement:  Good  Reliability:  Good  Impulse Control:  Good       @RESULASTCBCDIFFPANEL,TSH,LABLIPI,SYJYLWAC95,VEHQKADH97,MG,FOLATE,PROLACTIN,CRPRESULT,CMP,[id number])@    Lab Results   Component Value Date    GLUCOSE 88 06/04/2021    BUN 9 06/04/2021    CREATININE 0.84 06/04/2021    EGFR Unable to Calculate 05/31/2016    BCR 10.7 06/04/2021    K 4.4 06/04/2021    CO2 29.0 06/04/2021    CALCIUM 9.9 06/04/2021    ALBUMIN 4.6 01/26/2024    BILITOT 0.6 01/26/2024    AST 17 01/26/2024    ALT 11 01/26/2024       Lab Results   Component Value Date    WBC 6.33 01/26/2024    HGB 14.7 01/26/2024    HCT 43.4 01/26/2024    MCV 88.0 01/26/2024     01/26/2024       Lab Results   Component Value Date    CHOL 139 01/26/2024    CHLPL 135 05/31/2016    TRIG 67  01/26/2024    HDL 47 01/26/2024    LDL 78 01/26/2024       Latest Reference Range & Units 06/14/24 15:05   TSH Baseline 0.270 - 4.200 uIU/mL 2.920        Latest Reference Range & Units 06/22/20 10:45   Vitamin B-12 211 - 946 pg/mL 225       Latest Reference Range & Units 06/14/24 15:05   TSH Baseline 0.270 - 4.200 uIU/mL 2.920     Results  Testing  EKG today is within normal limits.        ENMANUEL  Dispensed  Strength Quantity Days Supply Provider Pharmacy   12/23/2024 Lisdexamfetamine Dimesyla 40MG 30 each 30 [facility]   11/21/2024 Lisdexamfetamine Dimesyla 40MG 30 each 30 [facility]   10/19/2024 Lisdexamfetamine Dimesyla 40MG 30 each 30 [facility]   09/18/2024 Lisdexamfetamine Dimesyla 30MG 30 each 30 [facility]       Assessment / Plan      Visit Diagnosis/Orders Placed This Visit:  Diagnoses and all orders for this visit:    1. ADHD, predominantly inattentive type (Primary)  -     lisdexamfetamine (Vyvanse) 50 MG capsule; Take 1 capsule by mouth Every Morning  Dispense: 30 capsule; Refill: 0    2. Insomnia due to other mental disorder  -     traZODone (DESYREL) 50 MG tablet; Take 0.5-1 tablets by mouth At Night As Needed for Sleep.  Dispense: 30 tablet; Refill: 0    3. Encounter for therapeutic drug monitoring  -     Magnesium; Future  -     TSH  -     T4, free  -     Vitamin B12 & Folate  -     Homocysteine; Future  -     Methylmalonic Acid, Serum; Future  -     Lipid Panel; Future  -     Hemoglobin A1c; Future  -     Comprehensive Metabolic Panel  -     CBC & Differential; Future        Assessment & Plan  1. Attention Deficit Hyperactivity Disorder (ADHD).  She reports that her ADHD medication is wearing off quickly, leading to fatigue and a crash after 2-3 hours. She is currently taking Vyvanse but experiences a significant decline in its effectiveness after a few hours. The potential benefits and drawbacks of Adderall XR were  discussed, including the possibility of more severe crashes compared to Vyvanse. A prescription for an increased dose of Vyvanse will be sent to [facility] on [address]. If the increased dose does not help, alternative stimulant medications such as Ritalin will be considered.    2. Anxiety.  Her anxiety has improved significantly since the last visit. She continues to use trazodone as needed for sleep but has reduced its usage. She is advised to continue with her current regimen and monitor her symptoms. A refill for trazodone will be provided to ensure she has it available if needed.    3. Depression.  Her depression has resolved, and she feels stable. She is advised to continue her current treatment plan. The previously prescribed Wellbutrin and BuSpar have been discontinued.    4. Insomnia  Patient reports improvement in insomnia although she still uses trazodone as needed.  A refill will be sent to the pharmacy    5 Health Maintenance.  She is advised to take a multivitamin supplement due to the potential depletion of B vitamins by certain stimulants. Blood work will be ordered to assess magnesium, B12, folate, homocysteine, methylmalonic acid, and lipid panel levels. If any abnormalities are detected in the blood work results, a [employer] message will be sent or a call will be made for significant findings.    Follow-up  The patient will follow up in 2 months.  Plan:    -Kyleena IUD  I-ncrease Vyvanse 50 mg daily; advised to monitor for hypomania  -Continue trazodone 25-50 mg at bedtime as needed for insomnia  -DC buspar -nonuse  -DC Wellbutrin-nonuse  -CPT - 7 atypical scores, strong indication of inattention and some vigilance.   -CSA signed, summary given  -Pt to bring genesight testing  -labs ordered 4/3/25     Treatment and medication options discussed during today's visit. Patient ackowledged and verbally consented to continue with current treatment plan and was educated on the importance of compliance  with treatment and follow-up appointments. Patient seems reasonably able to adhere to treatment plan.      Impression/Formulation: Patient appears alert and oriented.  Patient reports been diagnosed with ADHD in middle school and trailed many stimulants she took until around 11th grade and Focalin worked best.  Reports she is feeling more just disorganized and falling behind at work since her workload has doubled.  Patient also reports noticeable mood switches that occur a few times per month since age 20.  She experiences grandiosity, increased need to socialize, goal directed activity and hypersexuality for 3-4 days and then she is more depressed and isolates and struggles to function.  Patient denies any decrease need for sleep, risky behavior, frivolous spending or increasing physical energy during this time.  She reports chronic insomnia worsening over last few years due to racing thoughts and feelings of guilt.      Pt understands that mood stabilization is priority and then other factors such as ADHD can be managed with mood is supported. Pt reports she has taken multiple SSRIs and able to recall many of them but recalls having SI with Prozac.  She also took Effexor and Cymbalta which was ineffective.  Patient's father is diagnosed with bipolar disorder which increases  patient's heritability tenfold.  Given this factor in her adverse response to SSRIs and SNRIs Abilify will be initiated. Lengthy discussion with patient regarding the potential side effects of antipsychotic medications including: increased cholesterol, increased blood sugar, and possibility of weight gain.  Also discussed the need to routinely monitor labs with the initiation of these medications for the purpose of identifying possible metabolic disturbances, and adjusting medication regimen. The risk of muscle movement disorders with this class of medication was also discussed and patient advised to notify provider should they occur.  She is  agreeable to starting clonidine 0.1 mg at night as needed for insomnia if sleep does not improve.  We will obtain UDS and CPT at follow-up     Stimulant:   Medication options for treatment of ADHD discussed including stimulant and non-stimulant options. Extensive education is provided regarding risks associated with stimulant use including but not limited to:, insomnia, headache, exacerbation of tics, nervousness, irritability, overstimulation, tremor, dizziness, anorexia or change in appetite, nausea, dry mouth, constipation, diarrhea, weight loss, sexual dysfunction, psychotic episodes, seizures, palpitations, tachycardia, hypertension, rare activation (activation of hypomania, calderon, and/or suicidal ideations), cardiovascular adverse effects including sudden death. Patient denies any personal or family history of seizures or structural cardiac abnormalities.     Controlled substance agreement reviewed and signed by patient, Patient  is  informed that the medication is to be used by the patient only, the medication is to be used only as directed, and the medication should not be combined with other substances unless directed by a Provider/Prescriber. I advised patients to avoid taking  medication with alcohol or illicit/unprescribed substances., including THC. Patient understands that habitual use of THC while being prescribed a stimulant will result in provider discontinuing stimulant medication due to the cognition dulling effects of marijuana negating the cognitive enhancing action of the stimulant. The patient verbalizes understanding and agreement with this in their own words, and wishes to pursue proposed treatment plan.     Medication Considerations:  Discussed medication options and treatment plan of prescribed medication(s) as well as the risks, benefits, and potential side effects. Patient is agreeable to call the office with any worsening of symptoms or onset of side effects. Patient is agreeable to  call 911 or go to the nearest ER should he/she begin having SI/HI.    Quality Measures:   Never smoker    I advised Cecilia Granger of the risks of tobacco use.     Follow Up:   Return in about 2 months (around 6/3/2025).      MELANIA Greene, [facility]    Patient or patient representative verbalized consent for the use of Ambient Listening during the visit with  MELANIA Grullon for chart documentation. 4/3/2025  13:32 EDT    Answers submitted by the patient for this visit:   Answers submitted by the patient for this visit:  Problem not listed (Submitted on 4/3/2025)  Chief Complaint: Other medical problem  Reason for appointment: Med check  anorexia: No  joint pain: No  change in stool: No  joint swelling: No  swollen glands: No  vertigo: No  visual change: No  Onset: in the past 7 days  Chronicity: new  Frequency: intermittently  Medications tried: None  Additional information: Symptoms started today

## 2025-04-11 ENCOUNTER — LAB (OUTPATIENT)
Dept: LAB | Facility: HOSPITAL | Age: 25
End: 2025-04-11
Payer: COMMERCIAL

## 2025-04-11 DIAGNOSIS — Z51.81 ENCOUNTER FOR THERAPEUTIC DRUG MONITORING: ICD-10-CM

## 2025-04-11 LAB
ALBUMIN SERPL-MCNC: 4.5 G/DL (ref 3.5–5.2)
ALBUMIN/GLOB SERPL: 1.7 G/DL
ALP SERPL-CCNC: 72 U/L (ref 39–117)
ALT SERPL W P-5'-P-CCNC: 13 U/L (ref 1–33)
ANION GAP SERPL CALCULATED.3IONS-SCNC: 9.5 MMOL/L (ref 5–15)
AST SERPL-CCNC: 28 U/L (ref 1–32)
BASOPHILS # BLD AUTO: 0.05 10*3/MM3 (ref 0–0.2)
BASOPHILS NFR BLD AUTO: 1 % (ref 0–1.5)
BILIRUB SERPL-MCNC: 0.8 MG/DL (ref 0–1.2)
BUN SERPL-MCNC: 8 MG/DL (ref 6–20)
BUN/CREAT SERPL: 10.7 (ref 7–25)
CALCIUM SPEC-SCNC: 9.5 MG/DL (ref 8.6–10.5)
CHLORIDE SERPL-SCNC: 105 MMOL/L (ref 98–107)
CHOLEST SERPL-MCNC: 159 MG/DL (ref 0–200)
CO2 SERPL-SCNC: 23.5 MMOL/L (ref 22–29)
CREAT SERPL-MCNC: 0.75 MG/DL (ref 0.57–1)
DEPRECATED RDW RBC AUTO: 43.4 FL (ref 37–54)
EGFRCR SERPLBLD CKD-EPI 2021: 114.2 ML/MIN/1.73
EOSINOPHIL # BLD AUTO: 0.13 10*3/MM3 (ref 0–0.4)
EOSINOPHIL NFR BLD AUTO: 2.5 % (ref 0.3–6.2)
ERYTHROCYTE [DISTWIDTH] IN BLOOD BY AUTOMATED COUNT: 12.9 % (ref 12.3–15.4)
FOLATE SERPL-MCNC: 8.45 NG/ML (ref 4.78–24.2)
GLOBULIN UR ELPH-MCNC: 2.6 GM/DL
GLUCOSE SERPL-MCNC: 80 MG/DL (ref 65–99)
HBA1C MFR BLD: 4.3 % (ref 4.8–5.6)
HCT VFR BLD AUTO: 45.7 % (ref 34–46.6)
HCYS SERPL-MCNC: 42.2 UMOL/L (ref 0–15)
HDLC SERPL-MCNC: 82 MG/DL (ref 40–60)
HGB BLD-MCNC: 15 G/DL (ref 12–15.9)
IMM GRANULOCYTES # BLD AUTO: 0.01 10*3/MM3 (ref 0–0.05)
IMM GRANULOCYTES NFR BLD AUTO: 0.2 % (ref 0–0.5)
LDLC SERPL CALC-MCNC: 68 MG/DL (ref 0–100)
LDLC/HDLC SERPL: 0.84 {RATIO}
LYMPHOCYTES # BLD AUTO: 1.76 10*3/MM3 (ref 0.7–3.1)
LYMPHOCYTES NFR BLD AUTO: 34.1 % (ref 19.6–45.3)
MAGNESIUM SERPL-MCNC: 2 MG/DL (ref 1.6–2.6)
MCH RBC QN AUTO: 30.4 PG (ref 26.6–33)
MCHC RBC AUTO-ENTMCNC: 32.8 G/DL (ref 31.5–35.7)
MCV RBC AUTO: 92.7 FL (ref 79–97)
MONOCYTES # BLD AUTO: 0.41 10*3/MM3 (ref 0.1–0.9)
MONOCYTES NFR BLD AUTO: 7.9 % (ref 5–12)
NEUTROPHILS NFR BLD AUTO: 2.8 10*3/MM3 (ref 1.7–7)
NEUTROPHILS NFR BLD AUTO: 54.3 % (ref 42.7–76)
NRBC BLD AUTO-RTO: 0 /100 WBC (ref 0–0.2)
PLATELET # BLD AUTO: 314 10*3/MM3 (ref 140–450)
PMV BLD AUTO: 9.5 FL (ref 6–12)
POTASSIUM SERPL-SCNC: 4.3 MMOL/L (ref 3.5–5.2)
PROT SERPL-MCNC: 7.1 G/DL (ref 6–8.5)
RBC # BLD AUTO: 4.93 10*6/MM3 (ref 3.77–5.28)
SODIUM SERPL-SCNC: 138 MMOL/L (ref 136–145)
T4 FREE SERPL-MCNC: 1.14 NG/DL (ref 0.92–1.68)
TRIGL SERPL-MCNC: 39 MG/DL (ref 0–150)
TSH SERPL DL<=0.05 MIU/L-ACNC: 1.23 UIU/ML (ref 0.27–4.2)
VIT B12 BLD-MCNC: 212 PG/ML (ref 211–946)
VLDLC SERPL-MCNC: 9 MG/DL (ref 5–40)
WBC NRBC COR # BLD AUTO: 5.16 10*3/MM3 (ref 3.4–10.8)

## 2025-04-11 PROCEDURE — 80061 LIPID PANEL: CPT

## 2025-04-11 PROCEDURE — 36415 COLL VENOUS BLD VENIPUNCTURE: CPT

## 2025-04-11 PROCEDURE — 83735 ASSAY OF MAGNESIUM: CPT

## 2025-04-11 PROCEDURE — 82746 ASSAY OF FOLIC ACID SERUM: CPT | Performed by: NURSE PRACTITIONER

## 2025-04-11 PROCEDURE — 83036 HEMOGLOBIN GLYCOSYLATED A1C: CPT

## 2025-04-11 PROCEDURE — 83921 ORGANIC ACID SINGLE QUANT: CPT

## 2025-04-11 PROCEDURE — 82607 VITAMIN B-12: CPT | Performed by: NURSE PRACTITIONER

## 2025-04-11 PROCEDURE — 84439 ASSAY OF FREE THYROXINE: CPT | Performed by: NURSE PRACTITIONER

## 2025-04-11 PROCEDURE — 80050 GENERAL HEALTH PANEL: CPT

## 2025-04-11 PROCEDURE — 83090 ASSAY OF HOMOCYSTEINE: CPT

## 2025-04-15 LAB — METHYLMALONATE SERPL-SCNC: 537 NMOL/L (ref 0–378)

## 2025-05-16 DIAGNOSIS — F99 INSOMNIA DUE TO OTHER MENTAL DISORDER: ICD-10-CM

## 2025-05-16 DIAGNOSIS — F51.05 INSOMNIA DUE TO OTHER MENTAL DISORDER: ICD-10-CM

## 2025-05-16 DIAGNOSIS — F90.0 ADHD, PREDOMINANTLY INATTENTIVE TYPE: ICD-10-CM

## 2025-05-16 RX ORDER — LISDEXAMFETAMINE DIMESYLATE 50 MG/1
50 CAPSULE ORAL EVERY MORNING
Qty: 30 CAPSULE | Refills: 0 | Status: SHIPPED | OUTPATIENT
Start: 2025-05-16

## 2025-05-16 RX ORDER — TRAZODONE HYDROCHLORIDE 50 MG/1
25-50 TABLET ORAL NIGHTLY PRN
Qty: 30 TABLET | Refills: 0 | Status: SHIPPED | OUTPATIENT
Start: 2025-05-16

## 2025-05-16 NOTE — TELEPHONE ENCOUNTER
Rx Refill Note  Requested Prescriptions     Pending Prescriptions Disp Refills    lisdexamfetamine (Vyvanse) 50 MG capsule 30 capsule 0     Sig: Take 1 capsule by mouth Every Morning    traZODone (DESYREL) 50 MG tablet 30 tablet 0     Sig: Take 0.5-1 tablets by mouth At Night As Needed for Sleep.      Last office visit with prescribing clinician: 4/3/2025   Last telemedicine visit with prescribing clinician: 1/20/2025   Next office visit with prescribing clinician: 6/2/2025     Bev Hammond MA  05/16/25, 13:23 EDT

## 2025-05-19 DIAGNOSIS — F99 INSOMNIA DUE TO OTHER MENTAL DISORDER: ICD-10-CM

## 2025-05-19 DIAGNOSIS — F51.05 INSOMNIA DUE TO OTHER MENTAL DISORDER: ICD-10-CM

## 2025-05-19 RX ORDER — TRAZODONE HYDROCHLORIDE 50 MG/1
25-50 TABLET ORAL NIGHTLY PRN
Qty: 30 TABLET | Refills: 0 | OUTPATIENT
Start: 2025-05-19

## 2025-06-02 ENCOUNTER — TELEMEDICINE (OUTPATIENT)
Age: 25
End: 2025-06-02
Payer: COMMERCIAL

## 2025-06-02 VITALS — BODY MASS INDEX: 23.04 KG/M2 | WEIGHT: 126 LBS

## 2025-06-02 DIAGNOSIS — F41.8 SITUATIONAL ANXIETY: ICD-10-CM

## 2025-06-02 DIAGNOSIS — F33.0 MDD (MAJOR DEPRESSIVE DISORDER), RECURRENT EPISODE, MILD: ICD-10-CM

## 2025-06-02 DIAGNOSIS — F99 INSOMNIA DUE TO OTHER MENTAL DISORDER: ICD-10-CM

## 2025-06-02 DIAGNOSIS — F51.05 INSOMNIA DUE TO OTHER MENTAL DISORDER: ICD-10-CM

## 2025-06-02 DIAGNOSIS — F90.0 ADHD, PREDOMINANTLY INATTENTIVE TYPE: Primary | ICD-10-CM

## 2025-06-02 PROCEDURE — 99214 OFFICE O/P EST MOD 30 MIN: CPT | Performed by: NURSE PRACTITIONER

## 2025-06-02 PROCEDURE — 96127 BRIEF EMOTIONAL/BEHAV ASSMT: CPT | Performed by: NURSE PRACTITIONER

## 2025-06-02 RX ORDER — LISDEXAMFETAMINE DIMESYLATE 50 MG/1
50 CAPSULE ORAL EVERY MORNING
Qty: 30 CAPSULE | Refills: 0 | Status: SHIPPED | OUTPATIENT
Start: 2025-06-19
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
## 2025-06-15 DIAGNOSIS — F51.05 INSOMNIA DUE TO OTHER MENTAL DISORDER: ICD-10-CM

## 2025-06-15 DIAGNOSIS — F99 INSOMNIA DUE TO OTHER MENTAL DISORDER: ICD-10-CM

## 2025-06-16 RX ORDER — TRAZODONE HYDROCHLORIDE 50 MG/1
25-50 TABLET ORAL NIGHTLY PRN
Qty: 30 TABLET | Refills: 0 | Status: SHIPPED | OUTPATIENT
Start: 2025-06-16

## 2025-06-16 NOTE — TELEPHONE ENCOUNTER
Rx Refill Note  Requested Prescriptions     Pending Prescriptions Disp Refills    traZODone (DESYREL) 50 MG tablet [Pharmacy Med Name: TRAZODONE 50MG TABLETS] 30 tablet 0     Sig: TAKE 1/2 TO 1 TABLET BY MOUTH AT NIGHT AS NEEDED FOR SLEEP      Last office visit with prescribing clinician: 4/3/2025   Last telemedicine visit with prescribing clinician: 6/2/2025   Next office visit with prescribing clinician: Visit date not found     Bev Hammond MA  06/16/25, 07:12 EDT

## 2025-06-26 ENCOUNTER — TELEPHONE (OUTPATIENT)
Age: 25
End: 2025-06-26
Payer: COMMERCIAL

## 2025-06-26 NOTE — TELEPHONE ENCOUNTER
Pt called requesting a refill on their vyvanse. Pt stated there was not a refill put in during her last appt bc she was late picking up her refill before that.

## 2025-06-26 NOTE — TELEPHONE ENCOUNTER
Called PT to inform her per her chart provider sent rx refill in on 6/19/2025 to [facility] off [address] and [address]. PT V/U. Informed PT to call back with any further questions or concerns.

## 2025-07-30 DIAGNOSIS — F51.05 INSOMNIA DUE TO OTHER MENTAL DISORDER: ICD-10-CM

## 2025-07-30 DIAGNOSIS — F99 INSOMNIA DUE TO OTHER MENTAL DISORDER: ICD-10-CM

## 2025-07-30 RX ORDER — TRAZODONE HYDROCHLORIDE 50 MG/1
25-50 TABLET ORAL NIGHTLY PRN
Qty: 30 TABLET | Refills: 0 | Status: SHIPPED | OUTPATIENT
Start: 2025-07-30

## 2025-07-30 NOTE — TELEPHONE ENCOUNTER
Rx Refill Note  Requested Prescriptions     Pending Prescriptions Disp Refills    traZODone (DESYREL) 50 MG tablet [Pharmacy Med Name: TRAZODONE 50MG TABLETS] 30 tablet 0     Sig: TAKE 1/2 TO 1 TABLET BY MOUTH AT NIGHT AS NEEDED FOR SLEEP      Last office visit with prescribing clinician: 4/3/2025   Last telemedicine visit with prescribing clinician: 6/2/2025   Next office visit with prescribing clinician: Visit date not found     Bev Hammond MA  07/30/25, 08:33 EDT

## 2025-08-12 ENCOUNTER — TELEPHONE (OUTPATIENT)
Age: 25
End: 2025-08-12
Payer: COMMERCIAL

## 2025-08-12 DIAGNOSIS — F90.0 ADHD, PREDOMINANTLY INATTENTIVE TYPE: ICD-10-CM

## 2025-08-12 DIAGNOSIS — F33.0 MDD (MAJOR DEPRESSIVE DISORDER), RECURRENT EPISODE, MILD: ICD-10-CM

## 2025-08-12 RX ORDER — LISDEXAMFETAMINE DIMESYLATE 50 MG/1
50 CAPSULE ORAL EVERY MORNING
Qty: 30 CAPSULE | Refills: 0 | Status: SHIPPED | OUTPATIENT
Start: 2025-08-12